# Patient Record
Sex: FEMALE | Race: WHITE | NOT HISPANIC OR LATINO | Employment: OTHER | URBAN - METROPOLITAN AREA
[De-identification: names, ages, dates, MRNs, and addresses within clinical notes are randomized per-mention and may not be internally consistent; named-entity substitution may affect disease eponyms.]

---

## 2022-12-20 DIAGNOSIS — S42.222D CLOSED 2-PART DISPLACED FRACTURE OF SURGICAL NECK OF LEFT HUMERUS WITH ROUTINE HEALING: Primary | ICD-10-CM

## 2022-12-22 ENCOUNTER — CLINICAL SUPPORT (OUTPATIENT)
Dept: REHABILITATION | Facility: HOSPITAL | Age: 77
End: 2022-12-22
Payer: MEDICARE

## 2022-12-22 DIAGNOSIS — S42.222D CLOSED 2-PART DISPLACED FRACTURE OF SURGICAL NECK OF LEFT HUMERUS WITH ROUTINE HEALING: ICD-10-CM

## 2022-12-22 PROCEDURE — 97166 OT EVAL MOD COMPLEX 45 MIN: CPT | Mod: PN

## 2022-12-22 NOTE — PROGRESS NOTES
[facility]  Occupational Therapy Initial Evaluation    Date: 12/22/2022  Name: Katy Woodruff  Clinic Number: [id number]    Therapy Diagnosis:   Encounter Diagnosis   Name Primary?    Closed 2-part displaced fracture of surgical neck of left humerus with routine healing      Physician: Robert Witt MD    Physician Orders: OT eval and treat  Medical Diagnosis: Closed 2-part displaced fracture of surgical neck of left humerus with routine healing  Surgical Procedure and Date: n/a  Evaluation Date: 12/22/2022  Insurance Authorization Period Expiration: 12/31/2022  Plan of Care Certification Period: 02/02/2023  Date of Return to MD:   Visit # / Visits authorized: 1 / 12  FOTO: 1/3    Precautions:  Standard    Time In:10: 15  Time Out: 11: 00  Total Appointment Time (timed & untimed codes): 45 minutes    SUBJECTIVE     Date of Onset: November 27, 2022    History of Current Condition/Mechanism of Injury: Katy reports: that she was walking up the steps and fell backwards down two steps onto the L shoulder. Pt called 911 and was brought to [facility]. Pt  not in need of surgery, as doctor FREIDA Lombardi reported it was healing on its own.     Falls: none    Involved Side: L side  Dominant Side: Right  Imaging: CT; x- ray    Prior Therapy: yes, 40 years ago   Occupation:  retired   Working presently: retired  Duties: taking care of the house, and self , and quilting     Functional Limitations/Social History:    Previous functional status includes: Independent with all ADLs.     Current Functional Status   Home/Living environment: lives alone, two story home       Limitation of Functional Status as follows:   ADLs/IADLs:     - Feeding:  pt is independent     - Bathing: pt is independent     - Dressing/Grooming: pt is independent     - Driving: dependent      Leisure: quilting   Pain:  Functional Pain Scale Rating 0-10: Current 0/10, worst 10/10  Location: L shoulder   Description:  Aching  Aggravating Factors: Moving  Easing Factors: pt performing exercises     Patient's Goals for Therapy: would like to get back to normal     Medical History:   No past medical history on file.    Surgical History:    has no past surgical history on file.    Medications:   currently has no medications in their medication list.    Allergies:   Review of patient's allergies indicates:  Not on File       OBJECTIVE     19 lbs of force L hand  21 lbs of force R hand     25 1/2 cm  L UE  23 cm R UE    20 f  28 ex  42 abduction   Limitation/Restriction for FOTO shoulder Survey    Therapist reviewed FOTO scores for Katy Woodruff on 12/22/2022.   FOTO documents entered into [employer] - see Media section.    Limitation Score: 58%         Treatment   Total Treatment time (time-based codes) separate from Evaluation: 0 minutes    Patient Education and Home Exercises      Education provided:   - Pt was educated on elevation, brace wearing schedule, HEP, plan of care, goals ,    Written Home Exercises Provided: yes.  Exercises were reviewed and Katy was able to demonstrate them prior to the end of the session.  Katy demonstrated good  understanding of the education provided. See EMR under Patient Instructions for exercises provided during therapy sessions.     Pt was advised to perform these exercises free of pain, and to stop performing them if pain occurs.    Patient/Family Education: role of OT, goals for OT, scheduling/cancellations - pt verbalized understanding. Discussed insurance limitations with patient.      ASSESSMENT     Katy Woodruff is a 77 y.o. female referred to outpatient occupational therapy and presents with a medical diagnosis of L humerus fx.  Patient presents with the following therapy deficits: Decreased ROM, Decreased functional hand use, Increased pain, Edema, Joint Stiffness, and Diminished/Impaired Coordination and demonstrates limitations as described in the chart below. Following medical record  review it is determined that pt will benefit from occupational therapy services in order to maximize pain free and/or functional use of left shoulder. The following goals were discussed with the patient and patient is in agreement with them as to be addressed in the treatment plan. The patient's rehab potential is Good.     Anticipated barriers to occupational therapy: none  Pt has no cultural, educational or language barriers to learning provided.    Profile and History Assessment of Occupational Performance Level of Clinical Decision Making Complexity Score   Occupational Profile:   Katy Woodruff is a 77 y.o. female who lives alone and is retired Katy Woodruff has difficulty with  ADLs and IADLs as listed previously, which  Affecting herdaily functional abilities.      Comorbidities:    has no past medical history on file.    Medical and Therapy History Review:   Med hx reviewed              Performance Deficits    Physical:  Joint Mobility  Joint Stability  Muscle Power/Strength  Muscle Endurance  Edema  Gross Motor Coordination  Pain    Cognitive:  No Deficits    Psychosocial:    No Deficits     Clinical Decision Making:  moderate    Assessment Process:  Problem-Focused Assessments    Modification/Need for Assistance:  Not Necessary    Intervention Selection:  Several Treatment Options       moderate  Based on PMHX, co morbidities , data from assessments and functional level of assistance required with task and clinical presentation directly impacting function.       The following goals were discussed with the patient and patient is in agreement with them as to be addressed in the treatment plan.     Goals:   Pt will improve L shoulder AROM to wfl prior to dc  Pt will improve L shoulder pain to 1/10 or less when performing  light self-care and house tasks by dc  Pt will improve L UE swelling by 1 cm or less prior to dc  Pt will be 100% compliant with HEP prior to dc      PLAN   Plan of Care Certification:  12/22/2022 to 2/02/2023.     Outpatient Occupational Therapy 3 times weekly for 4 weeks to include the following interventions: Manual therapy/joint mobilizations, Modalities for pain management, Therapeutic exercises/activities., Strengthening, and Electrical Modalities.      Louisa Jarrett OT      I CERTIFY THE NEED FOR THESE SERVICES FURNISHED UNDER THIS PLAN OF TREATMENT AND WHILE UNDER MY CARE  Physician's comments:      Physician's Signature: ___________________________________________________

## 2022-12-29 ENCOUNTER — CLINICAL SUPPORT (OUTPATIENT)
Dept: REHABILITATION | Facility: HOSPITAL | Age: 77
End: 2022-12-29
Payer: MEDICARE

## 2022-12-29 DIAGNOSIS — S42.222D CLOSED 2-PART DISPLACED FRACTURE OF SURGICAL NECK OF LEFT HUMERUS WITH ROUTINE HEALING: Primary | ICD-10-CM

## 2022-12-29 PROCEDURE — 97140 MANUAL THERAPY 1/> REGIONS: CPT | Mod: PN

## 2022-12-29 PROCEDURE — 97110 THERAPEUTIC EXERCISES: CPT | Mod: PN

## 2022-12-29 NOTE — PROGRESS NOTES
Occupational Therapy Daily Treatment Note   Name: Katy Woodruff 1945  MRN: [id number]    Visit Date: 12/29/2022  Visit #: 2 / 12  Authorization period Expiration: 12/31/2022    Plan of Care Expiration: 02/02/2023  Precautions: No heavy lifting overhead using L UE    Time In: 11: 00  Time Out: 11: 45  Total 1:1 Treatment Time: 45 min    Treatment Diagnosis:   Encounter Diagnosis   Name Primary?    Closed 2-part displaced fracture of surgical neck of left humerus with routine healing Yes     Physician: Robert Witt MD    Subjective   Pt reports: that she is doing her exercises   She was compliant with home exercise program.     Pain Scale:  0/10 on VAS currently  Pain Location: left shoulder    Objective   Katy received therapeutic exercises to develop strength, endurance, ROM, and flexibility for 15 minutes including:  PROM exercsies including : cane exercises in supine and standing for L shoulder ER, overhead door pulley for L shoulder flexion/abduction   L shoulder UE ranger at floor level for 2 x 10 reps    Katy received the following manual therapy techniques: gentle PROM of the L shoulder for 10 mins in order to improve ROM and prevent joint stiffness    Katy received the following direct contact modalities after being cleared for contraindications: Heat pack to the L shoulder prior to mobilization       Home Exercises and Education Provided     Education provided re:   - progress towards goals   - role of therapy in multi - disciplinary team, goals for therapy  Pt educated on condition, POC, and expectations in therapy.  No spiritual or educational barriers to learning provided    Home exercises:  Pt will be provided HEP during course of treatment with progressions as appropriate. Pt was advised to perform these exercises free of pain, and to stop performing them if pain occurs.   Katy demonstrated good  understanding of the education provided.     Assessment   Katy  is progressing well towards her goals and no updates to goals at this time.     Pt prognosis is Good. Pt will continue to benefit from skilled outpatient physical therapy to address the deficits listed in the problem list chart on initial evaluation, provide pt/family education and to maximize pt's level of independence in the home and community environment.     Medical necessity is demonstrated by the impairments and functional limitations listed on the Initial Evaluation.     Anticipated barriers to physical therapy: none  Pt's spiritual, cultural and educational needs considered and pt agreeable to plan of care and goals.    Goals   Goals:   Pt will improve L shoulder AROM to wfl prior to dc  Pt will improve L shoulder pain to 1/10 or less when performing  light self-care and house tasks by dc  Pt will improve L UE swelling by 1 cm or less prior to dc  Pt will be 100% compliant with HEP prior to dc      Plan   Continue with established Plan of Care towards Occupational Therapy goals.   Discussed Plan of Care with patient: Yes  Louisa Jarrett OT  12/29/2022

## 2022-12-30 ENCOUNTER — CLINICAL SUPPORT (OUTPATIENT)
Dept: REHABILITATION | Facility: HOSPITAL | Age: 77
End: 2022-12-30
Payer: MEDICARE

## 2022-12-30 DIAGNOSIS — S42.222D CLOSED 2-PART DISPLACED FRACTURE OF SURGICAL NECK OF LEFT HUMERUS WITH ROUTINE HEALING: Primary | ICD-10-CM

## 2022-12-30 PROCEDURE — 97110 THERAPEUTIC EXERCISES: CPT | Mod: PN

## 2022-12-30 PROCEDURE — 97032 APPL MODALITY 1+ESTIM EA 15: CPT | Mod: PN

## 2022-12-30 NOTE — PROGRESS NOTES
Occupational Therapy Daily Treatment Note   Name: Katy Woodruff 1945  MRN: [id number]    Visit Date: 12/30/2022  Visit #: 3 / 12  Authorization period Expiration: 12/31/2022    Plan of Care Expiration: 02/02/2023  Precautions: No heavy lifting overhead using L UE    Time In: 11: 00  Time Out: 11: 45  Total 1:1 Treatment Time: 45 min    Treatment Diagnosis:   Encounter Diagnosis   Name Primary?    Closed 2-part displaced fracture of surgical neck of left humerus with routine healing Yes     Physician: Robert Witt MD    Subjective   Pt reports: pt ordered a door pulley  She was compliant with home exercise program.     Pain Scale:  0/10 on VAS currently  Pain Location: left shoulder    Objective   Katy received therapeutic exercises to develop strength, endurance, ROM, and flexibility for 30 minutes including:  PROM exercsies including : cane exercises in supine for L shoulder ER 15 reps, overhead door pulley for L shoulder flexion/abduction   B shoulder shrugs for 15 reps  B scapular pinches 15 reps  AROM L elbow extension for 30 reps.    Katy received the following direct contact modalities after being cleared for contraindications: Heat pack to the L shoulder prior to mobilization     Katy received the following supervised modalities:  IFC to the L shoulder for 10 mins @ 12.5 volts in order to alleviate pain levels.      Home Exercises and Education Provided     Education provided re:   - progress towards goals   - role of therapy in multi - disciplinary team, goals for therapy  Pt educated on condition, POC, and expectations in therapy.  No spiritual or educational barriers to learning provided    Home exercises:  Pt will be provided HEP during course of treatment with progressions as appropriate. Pt was advised to perform these exercises free of pain, and to stop performing them if pain occurs.   Katy demonstrated good  understanding of the education provided.      Assessment   Katy is progressing well towards her goals and no updates to goals at this time.     Pt prognosis is Good. Pt will continue to benefit from skilled outpatient physical therapy to address the deficits listed in the problem list chart on initial evaluation, provide pt/family education and to maximize pt's level of independence in the home and community environment.     Medical necessity is demonstrated by the impairments and functional limitations listed on the Initial Evaluation.     Anticipated barriers to physical therapy: none  Pt's spiritual, cultural and educational needs considered and pt agreeable to plan of care and goals.    Goals   Goals:   Pt will improve L shoulder AROM to wfl prior to dc  Pt will improve L shoulder pain to 1/10 or less when performing  light self-care and house tasks by dc  Pt will improve L UE swelling by 1 cm or less prior to dc  Pt will be 100% compliant with HEP prior to dc      Plan   Continue with established Plan of Care towards Occupational Therapy goals.   Discussed Plan of Care with patient: Yes  Louisa Jarrett OT  12/30/2022

## 2023-01-03 ENCOUNTER — CLINICAL SUPPORT (OUTPATIENT)
Dept: REHABILITATION | Facility: HOSPITAL | Age: 78
End: 2023-01-03
Payer: MEDICARE

## 2023-01-03 DIAGNOSIS — S42.222D CLOSED 2-PART DISPLACED FRACTURE OF SURGICAL NECK OF LEFT HUMERUS WITH ROUTINE HEALING: Primary | ICD-10-CM

## 2023-01-03 PROCEDURE — 97110 THERAPEUTIC EXERCISES: CPT | Mod: PN

## 2023-01-03 PROCEDURE — 97140 MANUAL THERAPY 1/> REGIONS: CPT | Mod: PN

## 2023-01-03 NOTE — PROGRESS NOTES
Occupational Therapy Daily Treatment Note   Name: Katy Woodruff 1945  MRN: [id number]    Visit Date: 1/3/2023  Visit #: 4 / 12  Authorization period Expiration: 12/31/2022    Plan of Care Expiration: 02/02/2023  Precautions: No heavy lifting overhead using L UE    Time In: 1: 15  Time Out: 2: 00  Total 1:1 Treatment Time: 45 min    Treatment Diagnosis:   Encounter Diagnosis   Name Primary?    Closed 2-part displaced fracture of surgical neck of left humerus with routine healing Yes     Physician: Robert Witt MD    Subjective   Pt reports: she has not seen any differences- good or bad   She was compliant with home exercise program.     Pain Scale:  0/10 on VAS currently  Pain Location: left shoulder    Objective   Katy received therapeutic exercises to develop strength, endurance, ROM, and flexibility for 30 minutes including:  PROM exercsies including :   cane exercises in standing for L shoulder ER 15 reps  overhead door pulley for L shoulder flexion  Self-prom in supine for L shoulder flexion     AROM L elbow extension for 15 reps.  L shoulder circumduction at hip level using towel against wall for 10 reps    Katy received the following direct contact modalities after being cleared for contraindications: Heat pack to the L shoulder prior to mobilization     Katy received the following manual therapy techniques:  Pt received gentle PROM to the L shoulder for flexion, abduction, ER/IR, extension to prevent joint stiffness       Home Exercises and Education Provided     Education provided re:   - progress towards goals   - role of therapy in multi - disciplinary team, goals for therapy  Pt educated on condition, POC, and expectations in therapy.  No spiritual or educational barriers to learning provided    Home exercises:  Pt will be provided HEP during course of treatment with progressions as appropriate. Pt was advised to perform these exercises free of pain, and to stop  performing them if pain occurs.   Katy demonstrated good  understanding of the education provided.     Assessment   Katy is progressing well towards her goals and no updates to goals at this time.     Pt prognosis is Good. Pt will continue to benefit from skilled outpatient physical therapy to address the deficits listed in the problem list chart on initial evaluation, provide pt/family education and to maximize pt's level of independence in the home and community environment.     Medical necessity is demonstrated by the impairments and functional limitations listed on the Initial Evaluation.     Anticipated barriers to physical therapy: none  Pt's spiritual, cultural and educational needs considered and pt agreeable to plan of care and goals.    Goals   Goals:   Pt will improve L shoulder AROM to wfl prior to dc  Pt will improve L shoulder pain to 1/10 or less when performing  light self-care and house tasks by dc  Pt will improve L UE swelling by 1 cm or less prior to dc  Pt will be 100% compliant with HEP prior to dc    Plan   Continue with established Plan of Care towards Occupational Therapy goals.   Discussed Plan of Care with patient: Yes  Louisa Jarrett OT  1/3/2023

## 2023-01-05 ENCOUNTER — CLINICAL SUPPORT (OUTPATIENT)
Dept: REHABILITATION | Facility: HOSPITAL | Age: 78
End: 2023-01-05
Payer: MEDICARE

## 2023-01-05 DIAGNOSIS — S42.222D CLOSED 2-PART DISPLACED FRACTURE OF SURGICAL NECK OF LEFT HUMERUS WITH ROUTINE HEALING: Primary | ICD-10-CM

## 2023-01-05 PROCEDURE — 97032 APPL MODALITY 1+ESTIM EA 15: CPT | Mod: PN

## 2023-01-05 PROCEDURE — 97110 THERAPEUTIC EXERCISES: CPT | Mod: PN

## 2023-01-05 NOTE — PROGRESS NOTES
Occupational Therapy Daily Treatment Note   Name: Katy Woodruff 1945  MRN: [id number]    Visit Date: 1/5/2023  Visit #: 5 / 12  Authorization period Expiration: 12/31/2022    Plan of Care Expiration: 02/02/2023  Precautions: No heavy lifting overhead using L UE    Time In: 10: 15  Time Out: 11: 00   Total 1:1 Treatment Time: 45 min    Treatment Diagnosis:   Encounter Diagnosis   Name Primary?    Closed 2-part displaced fracture of surgical neck of left humerus with routine healing Yes     Physician: Robert Witt MD    Subjective   Pt reports: she feels like she is able to do more with the L UE that she could not do before   She was compliant with home exercise program.     Pain Scale:  0/10 on VAS currently  Pain Location: left shoulder    Objective   Katy received therapeutic exercises to develop strength, endurance, ROM, and flexibility for 30 minutes including:  cane exercises in standing for L shoulder ER / abduction 10 reps each  overhead door pulley for L shoulder flexion  L shoulder wall slides : flexion , abduction  L shoulder circumduction at hip level UE ranger or 10 reps  L shoulder anterior/ posterior capsule stretches 10 reps  L shoulder IR stretch using towel for 10 reps    Katy received the following direct contact modalities after being cleared for contraindications: Heat pack to the L shoulder prior to mobilization     Katy received the following manual therapy techniques:  Pt received E stim to the L shoulder @ 16.5 volts for 10 mins in order to bring blood flow to the affected area and improve pain levels,       Home Exercises and Education Provided     Education provided re:   - progress towards goals   - role of therapy in multi - disciplinary team, goals for therapy  Pt educated on condition, POC, and expectations in therapy.  No spiritual or educational barriers to learning provided    Home exercises:  Pt will be provided HEP during course of  treatment with progressions as appropriate. Pt was advised to perform these exercises free of pain, and to stop performing them if pain occurs.   Katy demonstrated good  understanding of the education provided.     Assessment   Katy is progressing well towards her goals and no updates to goals at this time.     Pt prognosis is Good. Pt will continue to benefit from skilled outpatient physical therapy to address the deficits listed in the problem list chart on initial evaluation, provide pt/family education and to maximize pt's level of independence in the home and community environment.     Medical necessity is demonstrated by the impairments and functional limitations listed on the Initial Evaluation.     Anticipated barriers to physical therapy: none  Pt's spiritual, cultural and educational needs considered and pt agreeable to plan of care and goals.    Goals   Goals:   Pt will improve L shoulder AROM to wfl prior to dc  Pt will improve L shoulder pain to 1/10 or less when performing  light self-care and house tasks by dc  Pt will improve L UE swelling by 1 cm or less prior to dc  Pt will be 100% compliant with HEP prior to dc    Plan   Continue with established Plan of Care towards Occupational Therapy goals.   Discussed Plan of Care with patient: Yes  Louisa Jarrett OT  1/5/2023

## 2023-01-06 ENCOUNTER — CLINICAL SUPPORT (OUTPATIENT)
Dept: REHABILITATION | Facility: HOSPITAL | Age: 78
End: 2023-01-06
Payer: MEDICARE

## 2023-01-06 DIAGNOSIS — S42.222D CLOSED 2-PART DISPLACED FRACTURE OF SURGICAL NECK OF LEFT HUMERUS WITH ROUTINE HEALING: Primary | ICD-10-CM

## 2023-01-06 PROCEDURE — 97110 THERAPEUTIC EXERCISES: CPT | Mod: PN

## 2023-01-06 PROCEDURE — 97140 MANUAL THERAPY 1/> REGIONS: CPT | Mod: PN

## 2023-01-06 NOTE — PROGRESS NOTES
Occupational Therapy Daily Treatment Note   Name: Katy Woodruff 1945  MRN: [id number]    Visit Date: 1/6/2023  Visit #: 6 / 12  Authorization period Expiration: 12/31/2022    Plan of Care Expiration: 02/02/2023  Precautions: No heavy lifting overhead using L UE    Time In: 11: 00  Time Out: 11: 45  Total 1:1 Treatment Time: 45 min    Treatment Diagnosis:   Encounter Diagnosis   Name Primary?    Closed 2-part displaced fracture of surgical neck of left humerus with routine healing Yes     Physician: Robert Witt MD    Subjective   Pt reports: she feels like she is able to do more with the L UE that she could not do before   She was compliant with home exercise program.     Pain Scale:  0/10 on VAS currently at rest   Pain Location: left shoulder    Objective   Katy received therapeutic exercises to develop strength, endurance, ROM, and flexibility for 30 minutes including:   L shoulder IR using 2 # pulley 3 x 10 reps each  L shoulder extension 2 # pulley 3 x 10 reps each   overhead door pulley for L shoulder flexion/abduction   L shoulder circumduction at hip level UE ranger or 10 reps  Chest presses using 2 # dowel loren for 3 x 10 reps    Katy received the following direct contact modalities after being cleared for contraindications: Heat pack to the L shoulder prior to mobilization and ice pack post session     Katy received the following manual therapy techniques:  Pt received soft tissue mobilization to the L shoulder joint and surrounding musculature in conjuction with PROM to the L shoulder in order to alleviate pain and prevent joint stiffness       Home Exercises and Education Provided     Education provided re:   - progress towards goals   - role of therapy in multi - disciplinary team, goals for therapy  Pt educated on condition, POC, and expectations in therapy.  No spiritual or educational barriers to learning provided    Home exercises:  Pt will be provided HEP  during course of treatment with progressions as appropriate. Pt was advised to perform these exercises free of pain, and to stop performing them if pain occurs.   Katy demonstrated good  understanding of the education provided.     Assessment   Katy is progressing well towards her goals and no updates to goals at this time.     Pt prognosis is Good. Pt will continue to benefit from skilled outpatient physical therapy to address the deficits listed in the problem list chart on initial evaluation, provide pt/family education and to maximize pt's level of independence in the home and community environment.     Medical necessity is demonstrated by the impairments and functional limitations listed on the Initial Evaluation.     Anticipated barriers to physical therapy: none  Pt's spiritual, cultural and educational needs considered and pt agreeable to plan of care and goals.    Goals   Goals:   Pt will improve L shoulder AROM to wfl prior to dc  Pt will improve L shoulder pain to 1/10 or less when performing  light self-care and house tasks by dc  Pt will improve L UE swelling by 1 cm or less prior to dc  Pt will be 100% compliant with HEP prior to dc    Plan   Continue with established Plan of Care towards Occupational Therapy goals.   Discussed Plan of Care with patient: Yes  Louisa Jarrett OT  1/6/2023

## 2023-01-09 ENCOUNTER — CLINICAL SUPPORT (OUTPATIENT)
Dept: REHABILITATION | Facility: HOSPITAL | Age: 78
End: 2023-01-09
Payer: MEDICARE

## 2023-01-09 DIAGNOSIS — S42.222D CLOSED 2-PART DISPLACED FRACTURE OF SURGICAL NECK OF LEFT HUMERUS WITH ROUTINE HEALING: Primary | ICD-10-CM

## 2023-01-09 PROCEDURE — 97110 THERAPEUTIC EXERCISES: CPT | Mod: PN

## 2023-01-09 PROCEDURE — 97032 APPL MODALITY 1+ESTIM EA 15: CPT | Mod: PN

## 2023-01-09 NOTE — PROGRESS NOTES
Occupational Therapy Daily Treatment Note   Name: Katy Woodruff 1945  MRN: [id number]    Visit Date: 1/9/2023  Visit #: 7 / 12  Authorization period Expiration: 12/31/2022    Plan of Care Expiration: 02/02/2023  Precautions: No heavy lifting overhead using L UE    Time In: 11: 00  Time Out: 11: 45  Total 1:1 Treatment Time: 45 min    Treatment Diagnosis:   Encounter Diagnosis   Name Primary?    Closed 2-part displaced fracture of surgical neck of left humerus with routine healing Yes     Physician: Robert Witt MD    Subjective   Pt reports: she said she is still seeing some improvement in function but still cant reach into high cabinets  She was compliant with home exercise program.     Pain Scale:  0/10 on VAS currently at rest   Pain Location: left shoulder    Objective   Katy received therapeutic exercises to develop strength, endurance, ROM, and flexibility for 25 minutes including:  UBE for 6 mins  overhead door pulley for L shoulder flexion/abduction   B shoulder flexion using 3 # dowel loren in supine for 3 x 10 reps  Chest presses using 3 # dowel loren in supine for 3 x 10 reps    Katy received manual therapy: PROM to the L shoulder in order to prevent joint stiffness. Pt only tolerating this tx for ~5 mins.     Katy received the following direct contact modalities after being cleared for contraindications: Heat pack to the L shoulder prior to mobilization and ice pack post session     Katy received the following manual therapy techniques:  Pt received IFC on L shoulder @ 15.0 volts for 10 minutes in an effort to improve pain levels and bring blood flow to the affected area.       Home Exercises and Education Provided     Education provided re:   - progress towards goals   - role of therapy in multi - disciplinary team, goals for therapy  Pt educated on condition, POC, and expectations in therapy.  No spiritual or educational barriers to learning provided    Home  exercises:  Pt will be provided HEP during course of treatment with progressions as appropriate. Pt was advised to perform these exercises free of pain, and to stop performing them if pain occurs.   Katy demonstrated good  understanding of the education provided.     Assessment   Katy is progressing well towards her goals and no updates to goals at this time.     Pt prognosis is Good. Pt will continue to benefit from skilled outpatient physical therapy to address the deficits listed in the problem list chart on initial evaluation, provide pt/family education and to maximize pt's level of independence in the home and community environment.     Medical necessity is demonstrated by the impairments and functional limitations listed on the Initial Evaluation.     Anticipated barriers to physical therapy: none  Pt's spiritual, cultural and educational needs considered and pt agreeable to plan of care and goals.    Goals   Goals:   Pt will improve L shoulder AROM to wfl prior to dc  Pt will improve L shoulder pain to 1/10 or less when performing  light self-care and house tasks by dc  Pt will improve L UE swelling by 1 cm or less prior to dc  Pt will be 100% compliant with HEP prior to dc    Plan   Continue with established Plan of Care towards Occupational Therapy goals.   Discussed Plan of Care with patient: Yes  Louisa Jarrett OT  1/9/2023

## 2023-01-12 ENCOUNTER — CLINICAL SUPPORT (OUTPATIENT)
Dept: REHABILITATION | Facility: HOSPITAL | Age: 78
End: 2023-01-12
Payer: MEDICARE

## 2023-01-12 DIAGNOSIS — S42.222D CLOSED 2-PART DISPLACED FRACTURE OF SURGICAL NECK OF LEFT HUMERUS WITH ROUTINE HEALING: Primary | ICD-10-CM

## 2023-01-12 PROCEDURE — 97110 THERAPEUTIC EXERCISES: CPT | Mod: PN

## 2023-01-12 NOTE — PROGRESS NOTES
Occupational Therapy Daily Treatment Note   Name: Katy Woodruff 1945  MRN: [id number]    Visit Date: 1/12/2023  Visit #: 8 / 12  Authorization period Expiration: 12/31/2022    Plan of Care Expiration: 02/02/2023  Precautions: No heavy lifting overhead using L UE    Time In: 11: 00  Time Out: 11: 45  Total 1:1 Treatment Time: 45 min    Treatment Diagnosis:   No diagnosis found.    Physician: Robert Witt MD    Subjective   Pt reports: she said she is still seeing some improvement in function but still cant reach into high cabinets  She was compliant with home exercise program.     Pain Scale:  0/10 on VAS currently at rest   Pain Location: left shoulder    Objective   Katy received therapeutic exercises to develop strength, endurance, ROM, and flexibility for 35 minutes including:  UBE for 6 mins  overhead door pulley for L shoulder flexion/abduction   L shoulder extension using green theraband 3 x 10   L shoulder ER green theraband 3 x 10   L shoulder diagonal abduction 3 x 10   L shoulder flexion green theraband 3 x 10  Wall pushups x 15  B shoulder wall crawls flexion & adduction x 15    Katy received the following direct contact modalities after being cleared for contraindications: Heat pack to the L shoulder prior to mobilization and ice pack post session     Home Exercises and Education Provided     Education provided re:   - progress towards goals   - role of therapy in multi - disciplinary team, goals for therapy  Pt educated on condition, POC, and expectations in therapy.  No spiritual or educational barriers to learning provided    Home exercises:  Pt will be provided HEP during course of treatment with progressions as appropriate. Pt was advised to perform these exercises free of pain, and to stop performing them if pain occurs.   Katy demonstrated good  understanding of the education provided.     Assessment   Katy is progressing well towards her goals and no  updates to goals at this time.     Pt prognosis is Good. Pt will continue to benefit from skilled outpatient physical therapy to address the deficits listed in the problem list chart on initial evaluation, provide pt/family education and to maximize pt's level of independence in the home and community environment.     Medical necessity is demonstrated by the impairments and functional limitations listed on the Initial Evaluation.     Anticipated barriers to occupational therapy: none  Pt's spiritual, cultural and educational needs considered and pt agreeable to plan of care and goals.    Goals   Goals:   Pt will improve L shoulder AROM to wfl prior to dc.  Pt will improve L shoulder pain to 1/10 or less when performing  light self-care and house tasks by dc.  Pt will improve L UE swelling by 1 cm or less prior to dc.  Pt will be 100% compliant with HEP prior to dc.    Plan   Continue with established Plan of Care towards Occupational Therapy goals.   Discussed Plan of Care with patient: Yes  Louisa Jarrett OT  1/12/2023

## 2023-01-17 ENCOUNTER — CLINICAL SUPPORT (OUTPATIENT)
Dept: REHABILITATION | Facility: HOSPITAL | Age: 78
End: 2023-01-17
Payer: MEDICARE

## 2023-01-17 DIAGNOSIS — S42.222D CLOSED 2-PART DISPLACED FRACTURE OF SURGICAL NECK OF LEFT HUMERUS WITH ROUTINE HEALING: Primary | ICD-10-CM

## 2023-01-17 PROCEDURE — 97110 THERAPEUTIC EXERCISES: CPT | Mod: PN

## 2023-01-17 PROCEDURE — 97032 APPL MODALITY 1+ESTIM EA 15: CPT | Mod: PN

## 2023-01-17 NOTE — PROGRESS NOTES
Occupational Therapy Daily Treatment Note   Name: Katy Woodruff 1945  MRN: [id number]    Visit Date: 1/17/2023  Visit #: 9 / 12  Authorization period Expiration: 12/31/2022    Plan of Care Expiration: 02/02/2023  Precautions: No heavy lifting overhead using L UE    Time In: 11: 00  Time Out: 11: 45  Total 1:1 Treatment Time:  45 min    Treatment Diagnosis:   Encounter Diagnosis   Name Primary?    Closed 2-part displaced fracture of surgical neck of left humerus with routine healing Yes       Physician: Robert Witt MD    Subjective   Pt reports: shoulder is about the same from last session   She was compliant with home exercise program.     Pain Scale:  0/10 on VAS currently at rest   Pain Location: left shoulder    Objective   Katy received therapeutic exercises to develop strength, endurance, ROM, and flexibility for 35 minutes including:  UBE for 6 mins  overhead door pulley for L shoulder flexion/abduction   B shoulder chest/shoulder presses using 3 # dowel loren for 3 x 10 reps  B shoulder IR using 3 # dowel loren for 3 x 10 reps    Katy received the following direct contact modalities after being cleared for contraindications: Heat pack to the L shoulder prior to mobilization and ice pack post session     Katy received the following supervised modality:  Pt received IFC to the L shoulder @ 13.5 volts to alleviate pain levels.    Home Exercises and Education Provided     Education provided re:   - progress towards goals   - role of therapy in multi - disciplinary team, goals for therapy  Pt educated on condition, POC, and expectations in therapy.  No spiritual or educational barriers to learning provided    Home exercises:  Pt will be provided HEP during course of treatment with progressions as appropriate. Pt was advised to perform these exercises free of pain, and to stop performing them if pain occurs.   Katy demonstrated good  understanding of the education provided.      Assessment   Katy is progressing well towards her goals and no updates to goals at this time.     Pt prognosis is Good. Pt will continue to benefit from skilled outpatient physical therapy to address the deficits listed in the problem list chart on initial evaluation, provide pt/family education and to maximize pt's level of independence in the home and community environment.     Medical necessity is demonstrated by the impairments and functional limitations listed on the Initial Evaluation.     Anticipated barriers to occupational therapy: none  Pt's spiritual, cultural and educational needs considered and pt agreeable to plan of care and goals.    Goals   Goals:   Pt will improve L shoulder AROM to wfl prior to dc.  Pt will improve L shoulder pain to 1/10 or less when performing  light self-care and house tasks by dc.  Pt will improve L UE swelling by 1 cm or less prior to dc.  Pt will be 100% compliant with HEP prior to dc.    Plan   Continue with established Plan of Care towards Occupational Therapy goals.   Discussed Plan of Care with patient: Yes  Louisa Jarrett, OT  1/17/2023

## 2023-01-20 ENCOUNTER — CLINICAL SUPPORT (OUTPATIENT)
Dept: REHABILITATION | Facility: HOSPITAL | Age: 78
End: 2023-01-20
Payer: MEDICARE

## 2023-01-20 DIAGNOSIS — S42.222D CLOSED 2-PART DISPLACED FRACTURE OF SURGICAL NECK OF LEFT HUMERUS WITH ROUTINE HEALING: Primary | ICD-10-CM

## 2023-01-20 PROCEDURE — 97032 APPL MODALITY 1+ESTIM EA 15: CPT | Mod: PN

## 2023-01-20 PROCEDURE — 97110 THERAPEUTIC EXERCISES: CPT | Mod: PN

## 2023-01-20 NOTE — PROGRESS NOTES
Occupational Therapy Daily Treatment Note   Name: Katy Woodruff 1945  MRN: [id number]    Visit Date: 1/20/2023  Visit #: 10 / 12  Authorization period Expiration: 12/31/2022    Plan of Care Expiration: 02/02/2023  Precautions: No heavy lifting overhead using L UE    Time In: 11: 00  Time Out: 11: 45  Total 1:1 Treatment Time:  45 min    Treatment Diagnosis:   No diagnosis found.      Physician: Robert Witt MD    Subjective   Pt reports: shoulder is about the same from last session   She was compliant with home exercise program.     Pain Scale:  0/10 on VAS currently at rest   Pain Location: left shoulder    Objective   Katy received therapeutic exercises to develop strength, endurance, ROM, and flexibility for 35 minutes including:  UBE for 6 mins  overhead door pulley for L shoulder flexion/abduction   B scapular retraction in prone 3 x 10 reps  B scapular pinches using red theraband for 3 x 10 reps  B shoulder ER, arms abducted to 90 degrees using 1 # wt for 3 x 10 reps  L shoulder tricep pull downs using 3 # pulley wt for 3 x 10 reps    Katy received the following direct contact modalities after being cleared for contraindications: Heat pack to the L shoulder prior to mobilization and ice pack post session     Katy received the following supervised modality:  Pt received IFC to the L shoulder @ 13.5 volts to alleviate pain levels.    Home Exercises and Education Provided     Education provided re:   - progress towards goals   - role of therapy in multi - disciplinary team, goals for therapy  Pt educated on condition, POC, and expectations in therapy.  No spiritual or educational barriers to learning provided    Home exercises:  Pt will be provided HEP during course of treatment with progressions as appropriate. Pt was advised to perform these exercises free of pain, and to stop performing them if pain occurs.   Katy demonstrated good  understanding of the education  provided.     Assessment   Katy is progressing well towards her goals and no updates to goals at this time.     Pt prognosis is Good. Pt will continue to benefit from skilled outpatient physical therapy to address the deficits listed in the problem list chart on initial evaluation, provide pt/family education and to maximize pt's level of independence in the home and community environment.     Medical necessity is demonstrated by the impairments and functional limitations listed on the Initial Evaluation.     Anticipated barriers to occupational therapy: none  Pt's spiritual, cultural and educational needs considered and pt agreeable to plan of care and goals.    Goals   Goals:   Pt will improve L shoulder AROM to wfl prior to dc.  Pt will improve L shoulder pain to 1/10 or less when performing  light self-care and house tasks by dc.  Pt will improve L UE swelling by 1 cm or less prior to dc.  Pt will be 100% compliant with HEP prior to dc.    Plan   Continue with established Plan of Care towards Occupational Therapy goals.   Discussed Plan of Care with patient: Yes  Louisa Jarrett, OT  1/20/2023

## 2023-01-23 ENCOUNTER — CLINICAL SUPPORT (OUTPATIENT)
Dept: REHABILITATION | Facility: HOSPITAL | Age: 78
End: 2023-01-23
Payer: MEDICARE

## 2023-01-23 DIAGNOSIS — S42.222D CLOSED 2-PART DISPLACED FRACTURE OF SURGICAL NECK OF LEFT HUMERUS WITH ROUTINE HEALING: Primary | ICD-10-CM

## 2023-01-23 PROCEDURE — 97110 THERAPEUTIC EXERCISES: CPT | Mod: PN

## 2023-01-23 PROCEDURE — 97032 APPL MODALITY 1+ESTIM EA 15: CPT | Mod: PN

## 2023-01-23 NOTE — PROGRESS NOTES
Occupational Therapy Daily Treatment Note   Name: Katy Woodruff 1945  MRN: [id number]    Visit Date: 1/23/2023  Visit #: 11 / 12  Authorization period Expiration: 12/31/2022    Plan of Care Expiration: 02/02/2023  Precautions: No heavy lifting overhead using L UE    Time In: 11: 00  Time Out: 11: 45  Total 1:1 Treatment Time:  45 min    Treatment Diagnosis:   Encounter Diagnosis   Name Primary?    Closed 2-part displaced fracture of surgical neck of left humerus with routine healing Yes         Physician: Robert Witt MD    Subjective   Pt reports: shoulder is about the same from last session   She was compliant with home exercise program     Pain Scale:  0/10 on VAS currently at rest   Pain Location: left shoulder    Objective   Katy received therapeutic exercises to develop strength, endurance, ROM, and flexibility for 25 minutes including:  UBE for 6 mins  overhead door pulley for L shoulder flexion/abduction   Pt performed L shoulder reverse pendulums for 30 reps  Pt performed L shoulder scapular punches using 2 # wt for 30 reps  Pt performed L shoulder IR using overhead pulley (pain noted, pt told to be careful with this ex and if pain exhibited at home- stop the ex)  Pt performed L shoulder IR using towel (pt responding better to this stretch)    Katy received the following direct contact modalities after being cleared for contraindications: 10 mins Heat pack to the L shoulder prior to mobilization and ice pack post session     Katy received the following supervised modality: 10 mins  Pt received IFC to the L shoulder @ 13.5 volts to alleviate pain levels.    Home Exercises and Education Provided     Education provided re:   - progress towards goals   - role of therapy in multi - disciplinary team, goals for therapy  Pt educated on condition, POC, and expectations in therapy.  No spiritual or educational barriers to learning provided    Home exercises:  Pt will be  provided HEP during course of treatment with progressions as appropriate. Pt was advised to perform these exercises free of pain, and to stop performing them if pain occurs.   Katy demonstrated good  understanding of the education provided.     Assessment   Katy is progressing well towards her goals and no updates to goals at this time.     Pt prognosis is Good. Pt will continue to benefit from skilled outpatient physical therapy to address the deficits listed in the problem list chart on initial evaluation, provide pt/family education and to maximize pt's level of independence in the home and community environment.     Medical necessity is demonstrated by the impairments and functional limitations listed on the Initial Evaluation.     Anticipated barriers to occupational therapy: none  Pt's spiritual, cultural and educational needs considered and pt agreeable to plan of care and goals.    Goals   Goals:   Pt will improve L shoulder AROM to wfl prior to dc.  Pt will improve L shoulder pain to 1/10 or less when performing  light self-care and house tasks by dc.  Pt will improve L UE swelling by 1 cm or less prior to dc.  Pt will be 100% compliant with HEP prior to dc.    Plan   Continue with established Plan of Care towards Occupational Therapy goals.   Discussed Plan of Care with patient: Yes  Louisa Jarrett OT  1/23/2023

## 2023-01-26 ENCOUNTER — CLINICAL SUPPORT (OUTPATIENT)
Dept: REHABILITATION | Facility: HOSPITAL | Age: 78
End: 2023-01-26
Payer: MEDICARE

## 2023-01-26 DIAGNOSIS — S42.222D CLOSED 2-PART DISPLACED FRACTURE OF SURGICAL NECK OF LEFT HUMERUS WITH ROUTINE HEALING: Primary | ICD-10-CM

## 2023-01-26 PROCEDURE — 97110 THERAPEUTIC EXERCISES: CPT | Mod: PN

## 2023-01-26 PROCEDURE — 97032 APPL MODALITY 1+ESTIM EA 15: CPT | Mod: PN

## 2023-01-26 NOTE — PROGRESS NOTES
Occupational Therapy Discharge Summary    Name: Katy Woodruff 1945  MRN: [id number]   Date: 1/26/2023  Principal Problem:     Subjective:  Patient Discharged from acute occupational Therapy on 1/26/2023.  Please refer to prior OT noted date on 1/23/2023 for functional status.    Objective: Pt tolerated all treatment interventions well, including: therapeutic exercises, manual therapy techniques, and modalities for pain. No adverse effects reported      Measurements taken (1/26/2023) to be compared to initial eval:  L shoulder AROM:   110 flexion   115 abduction  52 extension  80 ER    25 lbs of force B hands   GOALS:    Pt will improve L shoulder AROM to wfl prior to dc. met  Pt will improve L shoulder pain to 1/10 or less when performing  light self-care and house tasks by dc. met  Pt will improve L UE swelling by 1 cm or less prior to dc. met  Pt will be 100% compliant with HEP prior to dc. met    Assessment:  Patient has met all goals and is not appropriate for therapy.    Reasons for Discontinuation of Therapy Services  Satisfactory goal achievement.      Plan:  Patient Discharged to: home with no OT services needed.    Louisa Jarrett, OT

## 2023-09-18 ENCOUNTER — HOSPITAL ENCOUNTER (EMERGENCY)
Facility: HOSPITAL | Age: 78
Discharge: HOME | End: 2023-09-18
Attending: STUDENT IN AN ORGANIZED HEALTH CARE EDUCATION/TRAINING PROGRAM
Payer: MEDICARE

## 2023-09-18 VITALS
BODY MASS INDEX: 23.3 KG/M2 | OXYGEN SATURATION: 96 % | RESPIRATION RATE: 18 BRPM | TEMPERATURE: 97 F | WEIGHT: 145 LBS | DIASTOLIC BLOOD PRESSURE: 71 MMHG | HEART RATE: 63 BPM | SYSTOLIC BLOOD PRESSURE: 121 MMHG | HEIGHT: 66 IN

## 2023-09-18 DIAGNOSIS — S82.891A CLOSED FRACTURE OF RIGHT ANKLE, INITIAL ENCOUNTER: Primary | ICD-10-CM

## 2023-09-18 DIAGNOSIS — T14.90XA TRAUMA: ICD-10-CM

## 2023-09-18 DIAGNOSIS — R53.1 WEAKNESS: ICD-10-CM

## 2023-09-18 DIAGNOSIS — S82.842A BIMALLEOLAR FRACTURE OF LEFT ANKLE, CLOSED, INITIAL ENCOUNTER: ICD-10-CM

## 2023-09-18 PROCEDURE — 96374 THER/PROPH/DIAG INJ IV PUSH: CPT | Mod: 59

## 2023-09-18 PROCEDURE — 63600175 PHARM REV CODE 636 W HCPCS: Performed by: STUDENT IN AN ORGANIZED HEALTH CARE EDUCATION/TRAINING PROGRAM

## 2023-09-18 PROCEDURE — 25000003 PHARM REV CODE 250: Performed by: STUDENT IN AN ORGANIZED HEALTH CARE EDUCATION/TRAINING PROGRAM

## 2023-09-18 PROCEDURE — 73610 X-RAY EXAM OF ANKLE: CPT | Mod: TC,RT

## 2023-09-18 PROCEDURE — 96361 HYDRATE IV INFUSION ADD-ON: CPT

## 2023-09-18 PROCEDURE — 96375 TX/PRO/DX INJ NEW DRUG ADDON: CPT | Mod: 59

## 2023-09-18 PROCEDURE — 93005 ELECTROCARDIOGRAM TRACING: CPT

## 2023-09-18 PROCEDURE — 73610 XR ANKLE COMPLETE 3 VIEW RIGHT: ICD-10-PCS | Mod: 26,RT,, | Performed by: RADIOLOGY

## 2023-09-18 PROCEDURE — 73610 X-RAY EXAM OF ANKLE: CPT | Mod: 26,RT,, | Performed by: RADIOLOGY

## 2023-09-18 PROCEDURE — 93010 ELECTROCARDIOGRAM REPORT: CPT | Mod: ,,, | Performed by: INTERNAL MEDICINE

## 2023-09-18 PROCEDURE — 93010 EKG 12-LEAD: ICD-10-PCS | Mod: ,,, | Performed by: INTERNAL MEDICINE

## 2023-09-18 PROCEDURE — 99284 EMERGENCY DEPT VISIT MOD MDM: CPT | Mod: 25

## 2023-09-18 PROCEDURE — 29515 APPLICATION SHORT LEG SPLINT: CPT | Mod: RT

## 2023-09-18 RX ORDER — FENTANYL CITRATE 50 UG/ML
75 INJECTION, SOLUTION INTRAMUSCULAR; INTRAVENOUS
Status: COMPLETED | OUTPATIENT
Start: 2023-09-18 | End: 2023-09-18

## 2023-09-18 RX ORDER — KETOROLAC TROMETHAMINE 30 MG/ML
15 INJECTION, SOLUTION INTRAMUSCULAR; INTRAVENOUS
Status: COMPLETED | OUTPATIENT
Start: 2023-09-18 | End: 2023-09-18

## 2023-09-18 RX ORDER — HYDROCODONE BITARTRATE AND ACETAMINOPHEN 5; 325 MG/1; MG/1
1 TABLET ORAL EVERY 6 HOURS PRN
Qty: 12 TABLET | Refills: 0 | Status: SHIPPED | OUTPATIENT
Start: 2023-09-18

## 2023-09-18 RX ADMIN — KETOROLAC TROMETHAMINE 15 MG: 30 INJECTION, SOLUTION INTRAMUSCULAR; INTRAVENOUS at 01:09

## 2023-09-18 RX ADMIN — SODIUM CHLORIDE 1000 ML: 9 INJECTION, SOLUTION INTRAVENOUS at 01:09

## 2023-09-18 RX ADMIN — FENTANYL CITRATE 75 MCG: 50 INJECTION, SOLUTION INTRAMUSCULAR; INTRAVENOUS at 01:09

## 2023-09-18 NOTE — ED PROVIDER NOTES
Encounter Date: 9/18/2023       History     Chief Complaint   Patient presents with    Ankle Pain     Pt. States she fell while at home and injured the right ankle. Swelling and bruising noted. Distal pulse palpable.      77-year-old female presenting for evaluation right ankle pain after mechanical fall while taking the garbage can out just prior to arrival.  She denies hitting her head, denies associated LOC. right ankle pain is worsened with movement and improves with rest.  She denies motor or sensory deficits.    The history is provided by the patient. No  was used.     Review of patient's allergies indicates:  No Known Allergies  No past medical history on file.  No past surgical history on file.  No family history on file.     Review of Systems   Constitutional: Negative.    HENT: Negative.     Eyes: Negative.    Respiratory: Negative.     Cardiovascular: Negative.    Gastrointestinal: Negative.    Endocrine: Negative.    Genitourinary: Negative.    Musculoskeletal:  Positive for arthralgias and joint swelling.   Skin:         Swelling medial and lateral right ankle   Neurological: Negative.    Psychiatric/Behavioral: Negative.     All other systems reviewed and are negative.      Physical Exam     Initial Vitals [09/18/23 1255]   BP Pulse Resp Temp SpO2   (!) 109/47 (!) 55 18 97.4 °F (36.3 °C) 96 %      MAP       --         Physical Exam    Abdominal: Abdomen is soft. Bowel sounds are normal.   Musculoskeletal:      Comments: Swelling medial and lateral right ankle, active passive range of motion limited secondary to pain.  PT and DP pulses intact     Neurological: She is alert.   Skin: Skin is warm.   Swelling to the medial and lateral right ankle         ED Course   Procedures  Labs Reviewed - No data to display       Imaging Results               X-Ray Ankle Complete Right (Final result)  Result time 09/18/23 13:25:31      Final result by Venkata Gan MD (09/18/23 13:25:31)      Reviewed OPTIONS including AVASTIN/EYLEA/LUCENTIS and patient wants to proceed with EYLEA treatment AND REPEAT EVERY 5 WKS X 2 - MILD EDEMA - NEW.               Impression:      1. Displaced bimalleolar fracture.  2. Suspected nondisplaced vertically orientated articular fracture involving the distal metadiaphysis of the tibia.  3. Mild medial subluxation of the distal tibia with hindfoot valgus.  This report was flagged in Epic as abnormal.      Electronically signed by: Venkata Gan  Date:    09/18/2023  Time:    13:25               Narrative:    EXAMINATION:  jXR ANKLE COMPLETE 3 VIEW RIGHT    CLINICAL HISTORY:  Injury, unspecified, initial encounter    TECHNIQUE:  AP, lateral, and oblique images of the right ankle were performed.    COMPARISON:  None    FINDINGS:  There is a mildly displaced comminuted oblique fracture involving the distal metadiaphysis of the fibula.  There is overlying soft tissue swelling.  There is a displaced oblique fracture involving the base of the medial malleolus.  Prominent overlying soft tissue swelling.  There is mild medial subluxation of the distal tibia in relation to the talar dome with mild hindfoot valgus and mild asymmetric narrowing of the tibiotalar joint space.    There is a subtle vertical lucency involving the distal metadiaphysis of the tibia suspicious for a nondisplaced fracture.  This extends to the tibiotalar articulation.    Visualized tarsal bones are intact.                                       Medications   ketorolac injection 15 mg (15 mg Intravenous Given 9/18/23 1305)   sodium chloride 0.9% bolus 1,000 mL 1,000 mL (1,000 mLs Intravenous New Bag 9/18/23 1306)   fentaNYL 50 mcg/mL injection 75 mcg (75 mcg Intravenous Given 9/18/23 1358)     Medical Decision Making  77-year-old female with right ankle injury.  PD/DP pulses intact  X-rays of the ankle shows displaced bimalleolar fracture, distal oblique comminuted fracture of the distal fibula, possible fracture involving the tibiotalar articular surface.Posterior splint placed, and pedal pulses remain intact.  The patient will be nonweightbearing.   Ortho on-call Dr. Flynn notified and who will see patient in the morning.  Patient was seen and reevaluated. Patient's symptoms seem to be stable. I discussed the patient's diagnosis, treatment plan, and plan for discharge with the patient. Patient was instructed to follow up with ortho and was given strict return precautions to the ED. The patient voiced understanding and agreed with the plan        Amount and/or Complexity of Data Reviewed  Independent Historian: parent  External Data Reviewed: radiology.  Radiology: ordered.    Risk  Prescription drug management.                               Clinical Impression:   Final diagnoses:  [T14.90XA] Trauma  [R53.1] Weakness  [S82.891A] Closed fracture of right ankle, initial encounter (Primary)  [S82.842A] Bimalleolar fracture of left ankle, closed, initial encounter        ED Disposition Condition    Discharge Stable          ED Prescriptions       Medication Sig Dispense Start Date End Date Auth. Provider    HYDROcodone-acetaminophen (NORCO) 5-325 mg per tablet Take 1 tablet by mouth every 6 (six) hours as needed for Pain. 12 tablet 9/18/2023 -- Jay Hanson MD          Follow-up Information       Follow up With Specialties Details Why Contact Info    Robert Witt MD Orthopedic Surgery  As needed [address] [place] [address] [address]  [facility] [address]  [facility] [address]  [phone]               Jay Hanosn MD  09/18/23 1770

## 2023-09-19 ENCOUNTER — TELEPHONE (OUTPATIENT)
Dept: ORTHOPEDICS | Facility: CLINIC | Age: 78
End: 2023-09-19

## 2023-09-19 ENCOUNTER — OFFICE VISIT (OUTPATIENT)
Dept: ORTHOPEDICS | Facility: CLINIC | Age: 78
End: 2023-09-19
Payer: MEDICARE

## 2023-09-19 ENCOUNTER — NURSE TRIAGE (OUTPATIENT)
Dept: ADMINISTRATIVE | Facility: CLINIC | Age: 78
End: 2023-09-19
Payer: MEDICARE

## 2023-09-19 ENCOUNTER — ANESTHESIA EVENT (OUTPATIENT)
Dept: SURGERY | Facility: HOSPITAL | Age: 78
End: 2023-09-19
Payer: MEDICARE

## 2023-09-19 ENCOUNTER — HOSPITAL ENCOUNTER (OUTPATIENT)
Dept: PREADMISSION TESTING | Facility: HOSPITAL | Age: 78
Discharge: HOME | End: 2023-09-19
Attending: ORTHOPAEDIC SURGERY
Payer: MEDICARE

## 2023-09-19 ENCOUNTER — HOSPITAL ENCOUNTER (OUTPATIENT)
Dept: RADIOLOGY | Facility: HOSPITAL | Age: 78
Discharge: HOME | End: 2023-09-19
Attending: ORTHOPAEDIC SURGERY
Payer: MEDICARE

## 2023-09-19 VITALS — BODY MASS INDEX: 23.3 KG/M2 | WEIGHT: 145 LBS | RESPIRATION RATE: 16 BRPM | HEIGHT: 66 IN

## 2023-09-19 DIAGNOSIS — S82.891A CLOSED FRACTURE OF RIGHT ANKLE, INITIAL ENCOUNTER: ICD-10-CM

## 2023-09-19 DIAGNOSIS — S82.871A CLOSED TRAUMATIC DISPLACED FRACTURE OF RIGHT TIBIAL PLAFOND, INITIAL ENCOUNTER: ICD-10-CM

## 2023-09-19 DIAGNOSIS — S82.841A CLOSED DISPLACED BIMALLEOLAR FRACTURE OF RIGHT ANKLE, INITIAL ENCOUNTER: Primary | ICD-10-CM

## 2023-09-19 DIAGNOSIS — R60.0 LOCALIZED EDEMA: ICD-10-CM

## 2023-09-19 DIAGNOSIS — Z01.818 PRE-OP TESTING: ICD-10-CM

## 2023-09-19 DIAGNOSIS — S82.841A CLOSED DISPLACED BIMALLEOLAR FRACTURE OF RIGHT ANKLE, INITIAL ENCOUNTER: ICD-10-CM

## 2023-09-19 PROCEDURE — 99213 OFFICE O/P EST LOW 20 MIN: CPT | Mod: PBBFAC,PN,25 | Performed by: ORTHOPAEDIC SURGERY

## 2023-09-19 PROCEDURE — 99999 PR PBB SHADOW E&M-EST. PATIENT-LVL III: ICD-10-PCS | Mod: PBBFAC,,, | Performed by: ORTHOPAEDIC SURGERY

## 2023-09-19 PROCEDURE — 73700 CT ANKLE (INCLUDING HINDFOOT) WITHOUT CONTRAST RIGHT: ICD-10-PCS | Mod: 26,RT,, | Performed by: RADIOLOGY

## 2023-09-19 PROCEDURE — 99204 PR OFFICE/OUTPT VISIT, NEW, LEVL IV, 45-59 MIN: ICD-10-PCS | Mod: 57,S$PBB,, | Performed by: ORTHOPAEDIC SURGERY

## 2023-09-19 PROCEDURE — 99999 PR PBB SHADOW E&M-EST. PATIENT-LVL III: CPT | Mod: PBBFAC,,, | Performed by: ORTHOPAEDIC SURGERY

## 2023-09-19 PROCEDURE — 73700 CT LOWER EXTREMITY W/O DYE: CPT | Mod: TC,RT

## 2023-09-19 PROCEDURE — 73700 CT LOWER EXTREMITY W/O DYE: CPT | Mod: 26,RT,, | Performed by: RADIOLOGY

## 2023-09-19 PROCEDURE — 99204 OFFICE O/P NEW MOD 45 MIN: CPT | Mod: 57,S$PBB,, | Performed by: ORTHOPAEDIC SURGERY

## 2023-09-19 PROCEDURE — 99900103 DSU ONLY-NO CHARGE-INITIAL HR (STAT)

## 2023-09-19 RX ORDER — PRAVASTATIN SODIUM 10 MG/1
TABLET ORAL DAILY
COMMUNITY

## 2023-09-19 NOTE — PROGRESS NOTES
Subjective:      Patient ID: Katy Woodruff is a 77 y.o. female.    Chief Complaint: Injury and Pain of the Right Ankle    Referring Provider: Jay Hanson Md  [address] [facility].  [facility] [address]  [place] [address]    HPI:  Ms. Rojo is a 77-year-old lady who presented today for evaluation of 1 day of right ankle pain which began on 2023 when she was bringing her trash can to the side of her house and slipped and fell causing severe pain in her ankle.  She was brought to the emergency room by a relative and after x-rays were taken which showed an ankle fracture she was placed in a splint by the ER physician.  She denied new onset numbness and tingling in her foot.  She has been elevating her foot.  She has been ambulating with a knee scooter.    Past medical history:   Hyperlipidemia  IBS   Glaucoma  History of hepatitis A    Past surgical history:   Colonoscopy   Bilateral cataract excision   Glaucoma surgery left eye   Sinuses    Review of patient's allergies indicates:  No Known Allergies    Social History     Occupational History    Retired  with [employer]   Tobacco Use    Smoking status: Denied    Smokeless tobacco: Denied   Substance and Sexual Activity    Alcohol use: Denied    Drug use: Denied    Sexual activity: Not on file      Family history:   Father:  , construction accident.    Mother: , old age.    Sister:  1, alive, denied medical problems.    Previous Hospitalizations:  Denied previous hospitalizations.    ROS:   Review of Systems   Constitutional: Negative for chills and fever.   HENT:  Negative for congestion.    Eyes:  Negative for blurred vision and double vision.   Cardiovascular:  Negative for chest pain and cyanosis.   Respiratory:  Negative for cough and shortness of breath.    Endocrine: Negative for polydipsia.   Hematologic/Lymphatic: Negative for adenopathy.   Skin:  Negative for flushing, itching and skin cancer.    Musculoskeletal:  Positive for joint pain and joint swelling. Negative for gout.   Gastrointestinal:  Negative for constipation, diarrhea and heartburn.   Genitourinary:  Negative for nocturia.   Neurological:  Negative for headaches and seizures.   Psychiatric/Behavioral:  Negative for depression and substance abuse. The patient is not nervous/anxious.    Allergic/Immunologic: Negative for environmental allergies.           Objective:      Physical Exam:   General: AAOx3.  No acute distress  HEENT: Normocephalic, PEARLA EOMI, Good Dentition  Neck: Supple, No JVD  Chest: Symetric, equal excursion on inspiration  Abdomen: Soft NTND  Vascular:  Pulses intact and equal bilaterally.  Capillary refill less than 3 seconds and equal bilaterally  Neurologic:  Pinprick and soft touch intact and equal bilaterally  Integment:  Medial and lateral ankle ecchymosis, fracture blisters at medial ankle.  Abrasion medial great toe eminence  Extremity:  Ankle/foot:  Dorsiflexion/plantar flexion decreased right ankle.  Drawer with increased excursion right ankle.  Crepitus with motion right ankle.  Tender with palpation right ankle.  Swelling right ankle.  Nontender at the Lisfranc interval bilaterally.  Nontender at the Achilles insertion on the calcaneus bilaterally.  Achilles palpable throughout full distribution bilaterally.  Nontender at the plantar fascia insertion on the calcaneus.  Good toe motion with minimal pain.  Radiography:  Personally reviewed x-rays of the right ankle completed on 09/18/2023 showed a displaced bimalleolar fracture with a nondisplaced distal tibial plafond fracture.      Assessment:       Impression:      1. Closed displaced bimalleolar fracture of right ankle, initial encounter    2. Closed traumatic displaced fracture of right tibial plafond, initial encounter    3. Right ankle pain         Plan:       1.  Discussed physical examination and radiographic findings with the patient. Katy understands  that she has a displaced bimalleolar right ankle fracture with a questionable tibial plafond fracture.  Treatment alternatives and outcomes were discussed with the patient she understands she could be treated conservatively with observation, activity modification, NSAIDs, bracing, physical therapy, traction, or she could consider surgical intervention such as ORIF of her ankle.  She states she would like to proceed with surgery.    2. Possible complications of surgery to include bleeding, infection, scarring, nerve/blood vessel/tendon damage, need for further surgery, failed surgery, failure to improve, possible persistent pain, possible arthritis, possible fracture, possible hardware failure, possible hardware breakage, and possible future amputation were discussed with the patient.  The patient was permitted to ask questions and all concerns were addressed to her satisfaction.    3. Consent for open reduction internal fixation right ankle.    4. Tentatively schedule surgery for 09/25/2023.    5. Patient already has pain medications from the ER if she needs pain medications will be prescribed on her date of surgery or earlier if she needs them.    6. Keflex 500 mg, 1 p.o. q.i.d., dispense 20, refill 0, prescription was forwarded to the patient's pharmacy by [facility].  She understands these are for postop use.    7. Refer for a CT scan of the right ankle to evaluate the tibial plateau plafond fracture and to plan surgical intervention.  8. Cam walker right ankle, 1 was fitted to the patient she understands she should wear it at all times and treat it like a cast and only remove it for hygiene she can place her splint on her ankle when showering and then pattern leg dry and place her Cam walker back on.    9. Elevate and ice right ankle.    10. Walker a prescription was given to the patient.    11. Follow up 10-12 days postop

## 2023-09-19 NOTE — H&P (VIEW-ONLY)
Chief Complaint: Injury and Pain of the Right Ankle    HPI:  Ms. Rojo is a 77-year-old lady who presented today for evaluation of 1 day of right ankle pain which began on 2023 when she was bringing her trash can to the side of her house and slipped and fell causing severe pain in her ankle.  She was brought to the emergency room by a relative and after x-rays were taken which showed an ankle fracture she was placed in a splint by the ER physician.  She denied new onset numbness and tingling in her foot.  She has been elevating her foot.  She has been ambulating with a knee scooter.    Past medical history:   Hyperlipidemia  IBS   Glaucoma  History of hepatitis A    Past surgical history:   Colonoscopy   Bilateral cataract excision   Glaucoma surgery left eye   Sinuses    Review of patient's allergies indicates:  No Known Allergies    Social History     Occupational History    Retired  with [employer]   Tobacco Use    Smoking status: Denied    Smokeless tobacco: Denied   Substance and Sexual Activity    Alcohol use: Denied    Drug use: Denied    Sexual activity: Not on file      Family history:   Father:  , construction accident.    Mother: , old age.    Sister:  1, alive, denied medical problems.    Previous Hospitalizations:  Denied previous hospitalizations.    ROS:   Review of Systems   Constitutional: Negative for chills and fever.   HENT:  Negative for congestion.    Eyes:  Negative for blurred vision and double vision.   Cardiovascular:  Negative for chest pain and cyanosis.   Respiratory:  Negative for cough and shortness of breath.    Endocrine: Negative for polydipsia.   Hematologic/Lymphatic: Negative for adenopathy.   Skin:  Negative for flushing, itching and skin cancer.   Musculoskeletal:  Positive for joint pain and joint swelling. Negative for gout.   Gastrointestinal:  Negative for constipation, diarrhea and heartburn.   Genitourinary:   Negative for nocturia.   Neurological:  Negative for headaches and seizures.   Psychiatric/Behavioral:  Negative for depression and substance abuse. The patient is not nervous/anxious.    Allergic/Immunologic: Negative for environmental allergies.           Objective:      Physical Exam:   General: AAOx3.  No acute distress  HEENT: Normocephalic, PEARLA EOMI, Good Dentition  Neck: Supple, No JVD  Chest: Symetric, equal excursion on inspiration  Abdomen: Soft NTND  Vascular:  Pulses intact and equal bilaterally.  Capillary refill less than 3 seconds and equal bilaterally  Neurologic:  Pinprick and soft touch intact and equal bilaterally  Integment:  Medial and lateral ankle ecchymosis, fracture blisters at medial ankle.  Abrasion medial great toe eminence  Extremity:  Ankle/foot:  Dorsiflexion/plantar flexion decreased right ankle.  Drawer with increased excursion right ankle.  Crepitus with motion right ankle.  Tender with palpation right ankle.  Swelling right ankle.  Nontender at the Lisfranc interval bilaterally.  Nontender at the Achilles insertion on the calcaneus bilaterally.  Achilles palpable throughout full distribution bilaterally.  Nontender at the plantar fascia insertion on the calcaneus.  Good toe motion with minimal pain.  Radiography:  Personally reviewed x-rays of the right ankle completed on 09/18/2023 showed a displaced bimalleolar fracture with a nondisplaced distal tibial plafond fracture.      Assessment:       Impression:      1. Closed displaced bimalleolar fracture of right ankle, initial encounter    2. Closed traumatic displaced fracture of right tibial plafond, initial encounter    3. Right ankle pain         Plan:       1.  Discussed physical examination and radiographic findings with the patient. Katy understands that she has a displaced bimalleolar right ankle fracture with a questionable tibial plafond fracture.  Treatment alternatives and outcomes were discussed with the patient she  understands she could be treated conservatively with observation, activity modification, NSAIDs, bracing, physical therapy, traction, or she could consider surgical intervention such as ORIF of her ankle.  She states she would like to proceed with surgery.    2. Possible complications of surgery to include bleeding, infection, scarring, nerve/blood vessel/tendon damage, need for further surgery, failed surgery, failure to improve, possible persistent pain, possible arthritis, possible fracture, possible hardware failure, possible hardware breakage, and possible future amputation were discussed with the patient.  The patient was permitted to ask questions and all concerns were addressed to her satisfaction.    3. Consent for open reduction internal fixation right ankle.    4. Tentatively schedule surgery for 09/25/2023.    5. Patient already has pain medications from the ER if she needs pain medications will be prescribed on her date of surgery or earlier if she needs them.    6. Keflex 500 mg, 1 p.o. q.i.d., dispense 20, refill 0, prescription was forwarded to the patient's pharmacy by [facility].  She understands these are for postop use.    7. Refer for a CT scan of the right ankle to evaluate the tibial plateau plafond fracture and to plan surgical intervention.  8. Cam walker right ankle, 1 was fitted to the patient she understands she should wear it at all times and treat it like a cast and only remove it for hygiene she can place her splint on her ankle when showering and then pattern leg dry and place her Cam walker back on.    9. Elevate and ice right ankle.    10. Walker a prescription was given to the patient.    11. Follow up 10-12 days postop

## 2023-09-19 NOTE — ANESTHESIA PREPROCEDURE EVALUATION
09/19/2023  Katy Woodruff is a 77 y.o., female.      Pre-op Assessment    I have reviewed the Patient Summary Reports.     I have reviewed the Nursing Notes. I have reviewed the NPO Status.   I have reviewed the Medications.     Review of Systems  Anesthesia Hx:  No problems with previous Anesthesia  Neg history of prior surgery. Denies Family Hx of Anesthesia complications.   Denies Personal Hx of Anesthesia complications.   Social:  Non-Smoker    Hematology/Oncology:  Hematology Normal   Oncology Normal     EENT/Dental:EENT/Dental Normal   Cardiovascular:  Cardiovascular Normal     Pulmonary:  Pulmonary Normal    Renal/:  Renal/ Normal     Hepatic/GI:  Hepatic/GI Normal IBS   Musculoskeletal:  Musculoskeletal Normal    Neurological:  Neurology Normal    Endocrine:  Endocrine Normal    Dermatological:  Skin Normal    Psych:  Psychiatric Normal           Physical Exam  General: Alert    Airway:  Mallampati: II   Mouth Opening: Normal  TM Distance: Normal  Neck ROM: Normal ROM    Dental:  Intact    Chest/Lungs:  Clear to auscultation, Normal Respiratory Rate    Heart:  Rate: Normal        Anesthesia Plan  Type of Anesthesia, risks & benefits discussed:    Anesthesia Type: Gen ETT  Post Op Pain Control Plan: multimodal analgesia  Airway Plan: Direct, Post-Induction  Informed Consent: Informed consent signed with the Patient and all parties understand the risks and agree with anesthesia plan.  All questions answered. Patient consented to blood products? No  ASA Score: 2  Day of Surgery Review of History & Physical: H&P Update referred to the surgeon/provider.    Ready For Surgery From Anesthesia Perspective.     .

## 2023-09-19 NOTE — H&P
Chief Complaint: Injury and Pain of the Right Ankle    HPI:  Ms. Rojo is a 77-year-old lady who presented today for evaluation of 1 day of right ankle pain which began on 2023 when she was bringing her trash can to the side of her house and slipped and fell causing severe pain in her ankle.  She was brought to the emergency room by a relative and after x-rays were taken which showed an ankle fracture she was placed in a splint by the ER physician.  She denied new onset numbness and tingling in her foot.  She has been elevating her foot.  She has been ambulating with a knee scooter.    Past medical history:   Hyperlipidemia  IBS   Glaucoma  History of hepatitis A    Past surgical history:   Colonoscopy   Bilateral cataract excision   Glaucoma surgery left eye   Sinuses    Review of patient's allergies indicates:  No Known Allergies    Social History     Occupational History    Retired  with [employer]   Tobacco Use    Smoking status: Denied    Smokeless tobacco: Denied   Substance and Sexual Activity    Alcohol use: Denied    Drug use: Denied    Sexual activity: Not on file      Family history:   Father:  , construction accident.    Mother: , old age.    Sister:  1, alive, denied medical problems.    Previous Hospitalizations:  Denied previous hospitalizations.    ROS:   Review of Systems   Constitutional: Negative for chills and fever.   HENT:  Negative for congestion.    Eyes:  Negative for blurred vision and double vision.   Cardiovascular:  Negative for chest pain and cyanosis.   Respiratory:  Negative for cough and shortness of breath.    Endocrine: Negative for polydipsia.   Hematologic/Lymphatic: Negative for adenopathy.   Skin:  Negative for flushing, itching and skin cancer.   Musculoskeletal:  Positive for joint pain and joint swelling. Negative for gout.   Gastrointestinal:  Negative for constipation, diarrhea and heartburn.   Genitourinary:   Negative for nocturia.   Neurological:  Negative for headaches and seizures.   Psychiatric/Behavioral:  Negative for depression and substance abuse. The patient is not nervous/anxious.    Allergic/Immunologic: Negative for environmental allergies.           Objective:      Physical Exam:   General: AAOx3.  No acute distress  HEENT: Normocephalic, PEARLA EOMI, Good Dentition  Neck: Supple, No JVD  Chest: Symetric, equal excursion on inspiration  Abdomen: Soft NTND  Vascular:  Pulses intact and equal bilaterally.  Capillary refill less than 3 seconds and equal bilaterally  Neurologic:  Pinprick and soft touch intact and equal bilaterally  Integment:  Medial and lateral ankle ecchymosis, fracture blisters at medial ankle.  Abrasion medial great toe eminence  Extremity:  Ankle/foot:  Dorsiflexion/plantar flexion decreased right ankle.  Drawer with increased excursion right ankle.  Crepitus with motion right ankle.  Tender with palpation right ankle.  Swelling right ankle.  Nontender at the Lisfranc interval bilaterally.  Nontender at the Achilles insertion on the calcaneus bilaterally.  Achilles palpable throughout full distribution bilaterally.  Nontender at the plantar fascia insertion on the calcaneus.  Good toe motion with minimal pain.  Radiography:  Personally reviewed x-rays of the right ankle completed on 09/18/2023 showed a displaced bimalleolar fracture with a nondisplaced distal tibial plafond fracture.      Assessment:       Impression:      1. Closed displaced bimalleolar fracture of right ankle, initial encounter    2. Closed traumatic displaced fracture of right tibial plafond, initial encounter    3. Right ankle pain         Plan:       1.  Discussed physical examination and radiographic findings with the patient. Katy understands that she has a displaced bimalleolar right ankle fracture with a questionable tibial plafond fracture.  Treatment alternatives and outcomes were discussed with the patient she  understands she could be treated conservatively with observation, activity modification, NSAIDs, bracing, physical therapy, traction, or she could consider surgical intervention such as ORIF of her ankle.  She states she would like to proceed with surgery.    2. Possible complications of surgery to include bleeding, infection, scarring, nerve/blood vessel/tendon damage, need for further surgery, failed surgery, failure to improve, possible persistent pain, possible arthritis, possible fracture, possible hardware failure, possible hardware breakage, and possible future amputation were discussed with the patient.  The patient was permitted to ask questions and all concerns were addressed to her satisfaction.    3. Consent for open reduction internal fixation right ankle.    4. Tentatively schedule surgery for 09/25/2023.    5. Patient already has pain medications from the ER if she needs pain medications will be prescribed on her date of surgery or earlier if she needs them.    6. Keflex 500 mg, 1 p.o. q.i.d., dispense 20, refill 0, prescription was forwarded to the patient's pharmacy by [facility].  She understands these are for postop use.    7. Refer for a CT scan of the right ankle to evaluate the tibial plateau plafond fracture and to plan surgical intervention.  8. Cam walker right ankle, 1 was fitted to the patient she understands she should wear it at all times and treat it like a cast and only remove it for hygiene she can place her splint on her ankle when showering and then pattern leg dry and place her Cam walker back on.    9. Elevate and ice right ankle.    10. Walker a prescription was given to the patient.    11. Follow up 10-12 days postop

## 2023-09-19 NOTE — TELEPHONE ENCOUNTER
I contacted the patient and stated her antibiotic had been called into the pharmacy below. The patient stated understanding.    I called the follow prescription in to [facility] in [place] per Dr. Flynn's office note:  Keflex 500 mg, 1 p.o. q.i.d., dispense 20, refill 0.    ----- Message from Carlos Correa sent at 9/19/2023  1:56 PM CDT -----  Contact: pt  Type: Needs Medical Advice  Who Called:  pt  Best Call Back Number: [phone]    Additional Information: Pt was just at the office waiting for her pain pills and antibiotic to get sent in to   [id number] - [place] [address] [address] [address]  Phone: [phone] Fax: [phone]      Please call back and advise.

## 2023-09-20 NOTE — TELEPHONE ENCOUNTER
I contacted the patient in regard to her nurse triage message. The patient stated the blisters on her ankle are larger and more painful than yesterday. I stated per the provider that blisters after a fracture are common and for her to rest, ice, and elevate her ankle. The patient stated understanding and that she would call back if the pain became any worse. I stated understanding and that we would see her if she felt she needed to be seen in office. The patient declined at this time as it is difficult for her to get to the office due to her injury.

## 2023-09-20 NOTE — TELEPHONE ENCOUNTER
"Pt reports has a foot boot on from breaking her foot, having blisters on foot that are getting large and painful, wanting to know what she can do for them. Pt advised to see a MD within 24 hours per protocol via ED/UC/PCP office, Pt encouraged to call back with any worsening symptoms or questions and verbalized understanding.    Reason for Disposition   [1] Severe pain or large blister AND [2] caller wants doctor to drain blister    Additional Information   Negative: Sounds like a life-threatening emergency to the triager   Negative: Patient sounds very sick or weak to the triager   Negative: [1] Looks infected (spreading redness, red streak, pus) AND [2] fever   Negative: [1] Looks infected AND [2] large red area (> 2 in. or 5 cm)   Negative: [1] Foul-smelling odor from foot AND [2] new or increased   Negative: [1] Purple or black skin on foot or toe AND [2] new or increased   Negative: Looks like a boil or deep ulcer   Negative: [1] Looks infected (spreading redness, red streak, pus) AND [2] no fever   Negative: [1] Blister on foot AND [2] diabetes mellitus or peripheral vascular disease ("poor circulation")    Protocols used: Blister - Foot and Hand-A-AH    "

## 2023-09-25 ENCOUNTER — ANESTHESIA (OUTPATIENT)
Dept: SURGERY | Facility: HOSPITAL | Age: 78
End: 2023-09-25
Payer: MEDICARE

## 2023-09-25 ENCOUNTER — HOSPITAL ENCOUNTER (OUTPATIENT)
Facility: HOSPITAL | Age: 78
Discharge: HOME | End: 2023-09-25
Attending: ORTHOPAEDIC SURGERY | Admitting: ORTHOPAEDIC SURGERY
Payer: MEDICARE

## 2023-09-25 VITALS
DIASTOLIC BLOOD PRESSURE: 65 MMHG | TEMPERATURE: 99 F | RESPIRATION RATE: 11 BRPM | HEIGHT: 66 IN | SYSTOLIC BLOOD PRESSURE: 150 MMHG | OXYGEN SATURATION: 97 % | WEIGHT: 145 LBS | BODY MASS INDEX: 23.3 KG/M2 | HEART RATE: 71 BPM

## 2023-09-25 DIAGNOSIS — S82.841D CLOSED DISPLACED BIMALLEOLAR FRACTURE OF RIGHT ANKLE WITH ROUTINE HEALING, SUBSEQUENT ENCOUNTER: Primary | ICD-10-CM

## 2023-09-25 PROBLEM — S82.841A CLOSED DISPLACED BIMALLEOLAR FRACTURE OF RIGHT LOWER LEG: Status: ACTIVE | Noted: 2023-09-25

## 2023-09-25 PROCEDURE — D9220A PRA ANESTHESIA: ICD-10-PCS | Mod: CRNA,,, | Performed by: NURSE ANESTHETIST, CERTIFIED REGISTERED

## 2023-09-25 PROCEDURE — 71000016 HC POSTOP RECOV ADDL HR: Performed by: ORTHOPAEDIC SURGERY

## 2023-09-25 PROCEDURE — 63600175 PHARM REV CODE 636 W HCPCS: Performed by: NURSE ANESTHETIST, CERTIFIED REGISTERED

## 2023-09-25 PROCEDURE — 63600175 PHARM REV CODE 636 W HCPCS: Performed by: ANESTHESIOLOGY

## 2023-09-25 PROCEDURE — 71000033 HC RECOVERY, INTIAL HOUR: Performed by: ORTHOPAEDIC SURGERY

## 2023-09-25 PROCEDURE — C1713 ANCHOR/SCREW BN/BN,TIS/BN: HCPCS | Performed by: ORTHOPAEDIC SURGERY

## 2023-09-25 PROCEDURE — 27201423 OPTIME MED/SURG SUP & DEVICES STERILE SUPPLY: Performed by: ORTHOPAEDIC SURGERY

## 2023-09-25 PROCEDURE — 25000003 PHARM REV CODE 250: Performed by: NURSE ANESTHETIST, CERTIFIED REGISTERED

## 2023-09-25 PROCEDURE — 63600175 PHARM REV CODE 636 W HCPCS: Performed by: ORTHOPAEDIC SURGERY

## 2023-09-25 PROCEDURE — 37000008 HC ANESTHESIA 1ST 15 MINUTES: Performed by: ORTHOPAEDIC SURGERY

## 2023-09-25 PROCEDURE — C1769 GUIDE WIRE: HCPCS | Performed by: ORTHOPAEDIC SURGERY

## 2023-09-25 PROCEDURE — 71000015 HC POSTOP RECOV 1ST HR: Performed by: ORTHOPAEDIC SURGERY

## 2023-09-25 PROCEDURE — 25000003 PHARM REV CODE 250: Performed by: ORTHOPAEDIC SURGERY

## 2023-09-25 PROCEDURE — 27823 PR OPEN TX TRIMALLEOLAR ANKLE FX W FIX PST LIP: ICD-10-PCS | Mod: RT,,, | Performed by: ORTHOPAEDIC SURGERY

## 2023-09-25 PROCEDURE — 36000709 HC OR TIME LEV III EA ADD 15 MIN: Performed by: ORTHOPAEDIC SURGERY

## 2023-09-25 PROCEDURE — 27823 TREATMENT OF ANKLE FRACTURE: CPT | Mod: RT,,, | Performed by: ORTHOPAEDIC SURGERY

## 2023-09-25 PROCEDURE — 36000708 HC OR TIME LEV III 1ST 15 MIN: Performed by: ORTHOPAEDIC SURGERY

## 2023-09-25 PROCEDURE — D9220A PRA ANESTHESIA: ICD-10-PCS | Mod: ANES,,, | Performed by: ANESTHESIOLOGY

## 2023-09-25 PROCEDURE — D9220A PRA ANESTHESIA: Mod: CRNA,,, | Performed by: NURSE ANESTHETIST, CERTIFIED REGISTERED

## 2023-09-25 PROCEDURE — D9220A PRA ANESTHESIA: Mod: ANES,,, | Performed by: ANESTHESIOLOGY

## 2023-09-25 PROCEDURE — 37000009 HC ANESTHESIA EA ADD 15 MINS: Performed by: ORTHOPAEDIC SURGERY

## 2023-09-25 DEVICE — SCREW R3CON NLOK PLT 3.5X14MM: Type: IMPLANTABLE DEVICE | Site: ANKLE | Status: FUNCTIONAL

## 2023-09-25 DEVICE — IMPLANTABLE DEVICE: Type: IMPLANTABLE DEVICE | Site: ANKLE | Status: FUNCTIONAL

## 2023-09-25 DEVICE — PLATE FIB CLUSTER 9 HOLE RIGHT: Type: IMPLANTABLE DEVICE | Site: ANKLE | Status: FUNCTIONAL

## 2023-09-25 DEVICE — SCREW R3CON LOK PLATE 3.5X14MM: Type: IMPLANTABLE DEVICE | Site: ANKLE | Status: FUNCTIONAL

## 2023-09-25 DEVICE — SCREW R3CON LOK PLATE 3.5X16MM: Type: IMPLANTABLE DEVICE | Site: ANKLE | Status: FUNCTIONAL

## 2023-09-25 DEVICE — SCREW R3CON NLOK PLT 3.5X16MM: Type: IMPLANTABLE DEVICE | Site: ANKLE | Status: FUNCTIONAL

## 2023-09-25 DEVICE — SCREW R3CON LOK PLATE 3.5X12MM: Type: IMPLANTABLE DEVICE | Site: ANKLE | Status: FUNCTIONAL

## 2023-09-25 DEVICE — SCREW R3CON LOK PLATE 3.5X10MM: Type: IMPLANTABLE DEVICE | Site: ANKLE | Status: FUNCTIONAL

## 2023-09-25 RX ORDER — PROPOFOL 10 MG/ML
VIAL (ML) INTRAVENOUS
Status: DISCONTINUED | OUTPATIENT
Start: 2023-09-25 | End: 2023-09-25

## 2023-09-25 RX ORDER — SODIUM CHLORIDE, SODIUM LACTATE, POTASSIUM CHLORIDE, CALCIUM CHLORIDE 600; 310; 30; 20 MG/100ML; MG/100ML; MG/100ML; MG/100ML
INJECTION, SOLUTION INTRAVENOUS CONTINUOUS
Status: DISCONTINUED | OUTPATIENT
Start: 2023-09-25 | End: 2023-09-25 | Stop reason: HOSPADM

## 2023-09-25 RX ORDER — BACITRACIN 500 [USP'U]/G
OINTMENT TOPICAL
Status: DISCONTINUED | OUTPATIENT
Start: 2023-09-25 | End: 2023-09-25 | Stop reason: HOSPADM

## 2023-09-25 RX ORDER — ONDANSETRON 2 MG/ML
4 INJECTION INTRAMUSCULAR; INTRAVENOUS DAILY PRN
Status: DISCONTINUED | OUTPATIENT
Start: 2023-09-25 | End: 2023-09-25 | Stop reason: HOSPADM

## 2023-09-25 RX ORDER — MORPHINE SULFATE 2 MG/ML
2 INJECTION, SOLUTION INTRAMUSCULAR; INTRAVENOUS EVERY 5 MIN PRN
Status: DISCONTINUED | OUTPATIENT
Start: 2023-09-25 | End: 2023-09-25 | Stop reason: HOSPADM

## 2023-09-25 RX ORDER — KETOROLAC TROMETHAMINE 30 MG/ML
INJECTION, SOLUTION INTRAMUSCULAR; INTRAVENOUS
Status: DISCONTINUED | OUTPATIENT
Start: 2023-09-25 | End: 2023-09-25

## 2023-09-25 RX ORDER — DEXAMETHASONE SODIUM PHOSPHATE 4 MG/ML
INJECTION, SOLUTION INTRA-ARTICULAR; INTRALESIONAL; INTRAMUSCULAR; INTRAVENOUS; SOFT TISSUE
Status: DISCONTINUED | OUTPATIENT
Start: 2023-09-25 | End: 2023-09-25

## 2023-09-25 RX ORDER — DIPHENHYDRAMINE HYDROCHLORIDE 50 MG/ML
12.5 INJECTION INTRAMUSCULAR; INTRAVENOUS
Status: DISCONTINUED | OUTPATIENT
Start: 2023-09-25 | End: 2023-09-25 | Stop reason: HOSPADM

## 2023-09-25 RX ORDER — FENTANYL CITRATE 50 UG/ML
INJECTION, SOLUTION INTRAMUSCULAR; INTRAVENOUS
Status: DISCONTINUED | OUTPATIENT
Start: 2023-09-25 | End: 2023-09-25

## 2023-09-25 RX ORDER — ACETAMINOPHEN 10 MG/ML
INJECTION, SOLUTION INTRAVENOUS
Status: DISCONTINUED | OUTPATIENT
Start: 2023-09-25 | End: 2023-09-25

## 2023-09-25 RX ORDER — MUPIROCIN 20 MG/G
OINTMENT TOPICAL ONCE
Status: COMPLETED | OUTPATIENT
Start: 2023-09-25 | End: 2023-09-25

## 2023-09-25 RX ORDER — SUCCINYLCHOLINE CHLORIDE 20 MG/ML
INJECTION INTRAMUSCULAR; INTRAVENOUS
Status: DISCONTINUED | OUTPATIENT
Start: 2023-09-25 | End: 2023-09-25

## 2023-09-25 RX ORDER — MIDAZOLAM HYDROCHLORIDE 1 MG/ML
INJECTION, SOLUTION INTRAMUSCULAR; INTRAVENOUS
Status: DISCONTINUED | OUTPATIENT
Start: 2023-09-25 | End: 2023-09-25

## 2023-09-25 RX ORDER — ONDANSETRON 2 MG/ML
INJECTION INTRAMUSCULAR; INTRAVENOUS
Status: DISCONTINUED | OUTPATIENT
Start: 2023-09-25 | End: 2023-09-25

## 2023-09-25 RX ORDER — LIDOCAINE HYDROCHLORIDE 10 MG/ML
1 INJECTION, SOLUTION EPIDURAL; INFILTRATION; INTRACAUDAL; PERINEURAL ONCE
Status: DISCONTINUED | OUTPATIENT
Start: 2023-09-25 | End: 2023-09-25 | Stop reason: HOSPADM

## 2023-09-25 RX ORDER — LIDOCAINE HYDROCHLORIDE 20 MG/ML
INJECTION, SOLUTION EPIDURAL; INFILTRATION; INTRACAUDAL; PERINEURAL
Status: DISCONTINUED | OUTPATIENT
Start: 2023-09-25 | End: 2023-09-25

## 2023-09-25 RX ORDER — ROCURONIUM BROMIDE 10 MG/ML
INJECTION, SOLUTION INTRAVENOUS
Status: DISCONTINUED | OUTPATIENT
Start: 2023-09-25 | End: 2023-09-25

## 2023-09-25 RX ORDER — IBUPROFEN 200 MG
200 TABLET ORAL EVERY 6 HOURS PRN
COMMUNITY

## 2023-09-25 RX ORDER — SODIUM CHLORIDE, SODIUM LACTATE, POTASSIUM CHLORIDE, CALCIUM CHLORIDE 600; 310; 30; 20 MG/100ML; MG/100ML; MG/100ML; MG/100ML
125 INJECTION, SOLUTION INTRAVENOUS CONTINUOUS
Status: DISCONTINUED | OUTPATIENT
Start: 2023-09-25 | End: 2023-09-25 | Stop reason: HOSPADM

## 2023-09-25 RX ORDER — KETOROLAC TROMETHAMINE 30 MG/ML
15 INJECTION, SOLUTION INTRAMUSCULAR; INTRAVENOUS ONCE
Status: DISCONTINUED | OUTPATIENT
Start: 2023-09-25 | End: 2023-09-25 | Stop reason: HOSPADM

## 2023-09-25 RX ADMIN — KETOROLAC TROMETHAMINE 30 MG: 30 INJECTION, SOLUTION INTRAMUSCULAR at 02:09

## 2023-09-25 RX ADMIN — ACETAMINOPHEN 1000 MG: 10 INJECTION INTRAVENOUS at 02:09

## 2023-09-25 RX ADMIN — SODIUM CHLORIDE, POTASSIUM CHLORIDE, SODIUM LACTATE AND CALCIUM CHLORIDE: 600; 310; 30; 20 INJECTION, SOLUTION INTRAVENOUS at 03:09

## 2023-09-25 RX ADMIN — GLYCOPYRROLATE 0.4 MG: 0.2 INJECTION INTRAMUSCULAR; INTRAVENOUS at 02:09

## 2023-09-25 RX ADMIN — ROCURONIUM BROMIDE 30 MG: 10 INJECTION, SOLUTION INTRAVENOUS at 02:09

## 2023-09-25 RX ADMIN — ONDANSETRON 4 MG: 2 INJECTION INTRAMUSCULAR; INTRAVENOUS at 02:09

## 2023-09-25 RX ADMIN — MUPIROCIN: 20 OINTMENT TOPICAL at 12:09

## 2023-09-25 RX ADMIN — ONDANSETRON 4 MG: 2 INJECTION INTRAMUSCULAR; INTRAVENOUS at 04:09

## 2023-09-25 RX ADMIN — SUGAMMADEX 50 MG: 100 INJECTION, SOLUTION INTRAVENOUS at 03:09

## 2023-09-25 RX ADMIN — PROPOFOL 200 MG: 10 INJECTION, EMULSION INTRAVENOUS at 02:09

## 2023-09-25 RX ADMIN — SODIUM CHLORIDE, POTASSIUM CHLORIDE, SODIUM LACTATE AND CALCIUM CHLORIDE: 600; 310; 30; 20 INJECTION, SOLUTION INTRAVENOUS at 12:09

## 2023-09-25 RX ADMIN — CEFAZOLIN 2 G: 2 INJECTION, POWDER, FOR SOLUTION INTRAMUSCULAR; INTRAVENOUS at 01:09

## 2023-09-25 RX ADMIN — LIDOCAINE HYDROCHLORIDE 100 MG: 20 INJECTION, SOLUTION EPIDURAL; INFILTRATION; INTRACAUDAL; PERINEURAL at 02:09

## 2023-09-25 RX ADMIN — MIDAZOLAM HYDROCHLORIDE 2 MG: 1 INJECTION, SOLUTION INTRAMUSCULAR; INTRAVENOUS at 01:09

## 2023-09-25 RX ADMIN — DEXAMETHASONE SODIUM PHOSPHATE 4 MG: 4 INJECTION, SOLUTION INTRA-ARTICULAR; INTRALESIONAL; INTRAMUSCULAR; INTRAVENOUS; SOFT TISSUE at 02:09

## 2023-09-25 RX ADMIN — ROCURONIUM BROMIDE 20 MG: 10 INJECTION, SOLUTION INTRAVENOUS at 02:09

## 2023-09-25 RX ADMIN — SUCCINYLCHOLINE CHLORIDE 100 MG: 20 INJECTION, SOLUTION INTRAMUSCULAR; INTRAVENOUS at 02:09

## 2023-09-25 RX ADMIN — FENTANYL CITRATE 100 MCG: 50 INJECTION INTRAMUSCULAR; INTRAVENOUS at 02:09

## 2023-09-25 RX ADMIN — MORPHINE SULFATE 2 MG: 2 INJECTION, SOLUTION INTRAMUSCULAR; INTRAVENOUS at 04:09

## 2023-09-25 NOTE — PLAN OF CARE
Has met unit/department guidelines for discharge from each phase of the post procedure continuum. Leaving floor per w/c with RN. MARILEE x3. Resp even and unlabored room air. No distress noted. Tolerating Po fluids without c/o nausea/vomiting. Post-op dsg clean, dry and intact. Copy of dc paperwork and RX x1 handed to pts sister. Instructed pts sister pt may take a pain pill when needed. V/u. Ice pack refilled and sent home with pt. All personal belongings returned to pt.

## 2023-09-25 NOTE — ANESTHESIA PROCEDURE NOTES
Intubation    Date/Time: 9/25/2023 2:05 PM    Performed by: Dominic Hurtado CRNA  Authorized by: Jordan Dixon MD    Intubation:     Induction:  Rapid sequence induction    Intubated:  Postinduction    Mask Ventilation:  Easy mask    Attempts:  1    Attempted By:  CRNA    Method of Intubation:  Video laryngoscopy    Blade:  Romero 3    Laryngeal View Grade: Grade I - full view of cords      Difficult Airway Encountered?: No      Complications:  None    Airway Device:  Oral endotracheal tube    Airway Device Size:  7.0    Style/Cuff Inflation:  Cuffed    Tube secured:  22    Secured at:  The teeth    Placement Verified By:  Capnometry    Complicating Factors:  None    Findings Post-Intubation:  BS equal bilateral

## 2023-09-25 NOTE — OP NOTE
[facility]  Orthopedic Surgery    9/25/2023    Katy Woodruff  [id number]      PREOPERATIVE DIAGNOSIS: Closed displaced bimalleolar fracture of right ankle, initial encounter [S85.374G]    POSTOPERATIVE DIAGNOSIS:  Displaced trimalleolar fracture, right ankle.    PROCEDURE:  1. Open reduction and internal fixation lateral malleolus, right ankle, with 9 hole [place] 28 Gorilla pre contoured locking fibular plate and 9 bone screws.    2. Open reduction internal fixation medial malleolus, right ankle, with 2 [place] 28 Gorilla 4-0 cannulated screws.    3. Closed reduction and internal fixation posterior malleolus, right ankle, with 1 [place] 28 Gorilla 4-0 cannulated screw.  4. Short-leg posterior mold plaster splint, right ankle.      SURGEON: Salvador Flynn D.O.    ASSISTANT: Orton Grinnell, CFA    ANESTHESIA:  General.    BLOOD LOSS:  Less than 10 cc.    TOURNIQUET:  40 minutes.    DRAINS:  None.    PATHOLOGY:  Debrided fibrous tissue.    COMPLICATION:  None    INDICATIONS FOR PROCEDURE:   Ms. Rojo is a 77-year-old lady who has right ankle pain which began on 09/18/2023 when she was bringing her trash can to the side of her house and slipped and fell causing severe pain in her ankle.  She was brought to the emergency room by a relative and after x-rays were taken which showed an ankle fracture she was placed in a splint by the ER physician.  She denied new onset numbness and tingling in her foot.  She has been elevating her foot.  She has been ambulating with a knee scooter.   She elected to proceed with surgery after complications to include bleeding, infection, scarring, nerve/blood vessel/tendon damage, need for further surgery, failed surgery, failure to improve, stiffness, skin slough, nonunion, fracture, hardware failure, hardware breakage, arthritis, and possible amputation were discussed.  She signed a consent.    PROCEDURE IN DETAIL:   The patient was brought to the operating room and was  transferred to the operating bed where all bony prominences were well padded.  General anesthesia was then administered by the Anesthesiology Department.  After general anesthesia was administered a tourniquet was applied to the upper part of the patient's right lower extremity.  The patient's right leg was then prepped with chlorhexidine solution and draped in the normal sterile fashion.  After prepping and draping bony and soft tissue landmarks were identified and a longitudinal incision site over the lateral malleolus was drawn along with a curvilinear incision site over the medial malleolus.  The patient's leg was then elevated, exsanguinated, tourniquet was inflated.         Sharp incision was then made with a #15 blade at the lateral malleolus incision site and dissection was taken to the level of the lateral malleolus fracture while protecting vital structures.  The lateral malleolus fracture was freed from investing and fibrous tissue.  The fracture was then opened and fibrous tissue was curetted and also removed with a rongeur.  It was then copiously irrigated.  The fracture was then reduced and held in place with a lobster claw reduction clamp.  The reduction was checked with fluoroscopic orthogonal views and the patient was found to be in near anatomic alignment.  A plate was then chosen and placed on the lateral malleolus and held in place with 2 olive wires.  Plate placement was then checked with fluoroscopic orthogonal views and when the plate was found to be in the appropriate position with near anatomic alignment of the fracture screw holes were drilled measured and screws were placed.  Follow up wires were then removed and remaining screw holes were drilled measured and screws were placed.  Fracture alignment, plate placement and screw placement were then recheck with fluoroscopic orthogonal views and the patient was found to be in near anatomic alignment with good placement of the plate and screws.   The incision was then copiously irrigated and then packed with a moistened Ray-Thais.       Attention was then placed on the medial malleolus where sharp incision was made with a #15 blade followed by dissection to the level of the medial malleolus fracture while protecting vital structures.  The fracture was opened and fibrous tissue was removed with a rongeur and curette.  Once clean bone was obtained it was copiously irrigated.  A reduction was then completed with a pointed reduction clamp.  The reduction was checked with fluoroscopic orthogonal views and when the patient was found to be in near anatomic alignment 2 guidewires were placed from the distal medial malleolus across the fracture and into the proximal fragment under fluoroscopic orthogonal views.  Once the 2 pins were in place and in good position they were measured and then over-drilled.  Appropriate length screws were then placed on the guidewires and seated against the fragment compressing the fragment.  The guidewires were then removed and fluoroscopic orthogonal views were taken showing near anatomic alignment of the fracture with good placement of the screws.  The incision was then copiously irrigated and packed with a moist Ray-Thais.         Attention was then placed on the posterior malleolus fragment where a reduction was completed with dorsiflexion of the ankle.  A stab incision was made at the anterior ankle with a #15 blade followed by blunt dissection to the anterior cortex of the distal tibia with mosquito hemostat.  A guidewire was then advanced from the anterior ankle directed posterior laterally across the fracture under fluoroscopic orthogonal views.  Once the guidewire was in the appropriate position it was measured and then over-drilled.  An appropriate length screw was then placed and seated compressing the posterior malleolus fragment.  The guidewire was then removed and final fluoroscopic orthogonal views of the patient's ankle  were taken showing near anatomic alignment of all fractures with good placement of plates and screws.       The tourniquet was then released and the lateral malleolus incision site was inspected and full hemostasis was ensured after removal of the moistened Ray-Thais.  It was then closed in layers with Vicryl suture with final closure with nylon suture in a vertical mattress type of fashion.  The medial malleolus fracture site then had its Ray-Thais removed and full hemostasis ensured.  It was then closed in layers with Vicryl suture with final closure with nylon suture in a vertical mattress type of fashion.  The anterior incision at the distal tibia was then closed with nylon suture in a simple interrupted fashion.  The incisions were then dressed with Adaptic, sterile gauze, sterile cast padding followed by a posterior mold plaster splint and an Ace wrap.  She was then awakened by anesthesia and transferred from the operating room to the recovery room in stable condition.  She tolerated the procedures well without complication.

## 2023-09-25 NOTE — PLAN OF CARE
"Pt states, "I feel like I can't clear my throat." Explained to pt this is a normal sensation to experience after being intubated. Dr. Dixon to bedside to speak with pt re this concern. Pt states, "I feel better now." No new orders received.   "

## 2023-09-25 NOTE — ANESTHESIA POSTPROCEDURE EVALUATION
Anesthesia Post Evaluation    Patient: Katy Woodruff    Procedure(s) Performed: Procedure(s) (LRB):  ORIF, ANKLE (Right)    Final Anesthesia Type: general      Patient location during evaluation: PACU  Patient participation: Yes- Able to Participate  Level of consciousness: awake and alert  Post-procedure vital signs: reviewed and stable  Pain management: adequate  Airway patency: patent    PONV status at discharge: No PONV  Anesthetic complications: no      Cardiovascular status: blood pressure returned to baseline  Respiratory status: unassisted  Hydration status: euvolemic  Follow-up not needed.          Vitals Value Taken Time   /67 09/25/23 1618   Temp 37.1 °C (98.8 °F) 09/25/23 1555   Pulse 60 09/25/23 1618   Resp 67 09/25/23 1618   SpO2 99 % 09/25/23 1618   Vitals shown include unvalidated device data.      No case tracking events are documented in the log.      Pain/Roxanne Score: Roxanne Score: 9 (9/25/2023  4:10 PM)

## 2023-09-25 NOTE — TRANSFER OF CARE
"Anesthesia Transfer of Care Note    Patient: Katy Woodruff    Procedure(s) Performed: Procedure(s) (LRB):  ORIF, ANKLE (Right)    Patient location: PACU    Anesthesia Type: general    Transport from OR: Transported from OR on room air with adequate spontaneous ventilation    Post pain: adequate analgesia    Post assessment: no apparent anesthetic complications and tolerated procedure well    Post vital signs: stable    Level of consciousness: sedated and responds to stimulation    Nausea/Vomiting: no nausea/vomiting    Complications: none    Transfer of care protocol was followed      Last vitals:   Visit Vitals  BP (!) 152/78   Pulse 69   Temp 36.6 °C (97.8 °F)   Resp 17   Ht 5' 6" (1.676 m)   Wt 65.8 kg (145 lb)   SpO2 98%   BMI 23.40 kg/m²     "

## 2023-09-25 NOTE — DISCHARGE SUMMARY
[facility] Surgery  Discharge Note  Short Stay    Procedure(s) (LRB):  ORIF, ANKLE (Right)      OUTCOME: Patient tolerated treatment/procedure well without complication and is now ready for discharge.    DISPOSITION: Home or Self Care    FINAL DIAGNOSIS:  Displaced trimalleolar fracture, right ankle.    FOLLOWUP: In clinic    DISCHARGE INSTRUCTIONS:    Discharge Procedure Orders   Diet Adult Regular     Keep surgical extremity elevated     Ice to affected area     Other restrictions (specify):   Order Comments: 1. Elevate and ice right ankle.    2. Do not remove dressing, keep dressing clean and dry.    3. Walk with crutches/walker/knee scooter nonweightbearing to the right foot.  Must stay in a clean dry environment.     Notify your health care provider if you experience any of the following:  temperature >100.4     Leave dressing on - Keep it clean, dry, and intact until clinic visit   Order Comments: Do not remove dressing, keep dressing clean and dry.     Weight bearing restrictions (specify):   Order Comments: Walk with crutches/walker/knee scooter nonweightbearing to the right foot.         Clinical Reference Documents Added to Patient Instructions         Document    HOW TO PREVENT SURGICAL SITE INFECTIONS (ENGLISH)    OPEN REDUCTION AND INTERNAL FIXATION SURGERY DISCHARGE INSTRUCTIONS (ENGLISH)    POSTOPERATIVE PAIN DISCHARGE INSTRUCTIONS (ENGLISH)            TIME SPENT ON DISCHARGE: 30 minutes

## 2023-09-25 NOTE — DISCHARGE INSTRUCTIONS

## 2023-10-13 ENCOUNTER — OFFICE VISIT (OUTPATIENT)
Dept: ORTHOPEDICS | Facility: CLINIC | Age: 78
End: 2023-10-13
Payer: MEDICARE

## 2023-10-13 VITALS — BODY MASS INDEX: 23.3 KG/M2 | HEART RATE: 77 BPM | HEIGHT: 66 IN | OXYGEN SATURATION: 96 % | WEIGHT: 145 LBS

## 2023-10-13 DIAGNOSIS — Z48.02 VISIT FOR SUTURE REMOVAL: ICD-10-CM

## 2023-10-13 DIAGNOSIS — S82.851D CLOSED TRIMALLEOLAR FRACTURE OF ANKLE, RIGHT, WITH ROUTINE HEALING, SUBSEQUENT ENCOUNTER: Primary | ICD-10-CM

## 2023-10-13 PROCEDURE — 99999 PR PBB SHADOW E&M-EST. PATIENT-LVL III: CPT | Mod: PBBFAC,,, | Performed by: ORTHOPAEDIC SURGERY

## 2023-10-13 PROCEDURE — 99213 OFFICE O/P EST LOW 20 MIN: CPT | Mod: PBBFAC,PN | Performed by: ORTHOPAEDIC SURGERY

## 2023-10-13 PROCEDURE — 99024 PR POST-OP FOLLOW-UP VISIT: ICD-10-PCS | Mod: POP,,, | Performed by: ORTHOPAEDIC SURGERY

## 2023-10-13 PROCEDURE — 99024 POSTOP FOLLOW-UP VISIT: CPT | Mod: POP,,, | Performed by: ORTHOPAEDIC SURGERY

## 2023-10-13 PROCEDURE — 99999PBSHW PR PBB SHADOW TECHNICAL ONLY FILED TO HB: ICD-10-PCS | Mod: PBBFAC,,,

## 2023-10-13 PROCEDURE — 29405 PR APPLY SHORT LEG CAST: ICD-10-PCS | Mod: S$PBB,58,RT, | Performed by: ORTHOPAEDIC SURGERY

## 2023-10-13 PROCEDURE — 29405 APPL SHORT LEG CAST: CPT | Mod: S$PBB,58,RT, | Performed by: ORTHOPAEDIC SURGERY

## 2023-10-13 PROCEDURE — 99999PBSHW PR PBB SHADOW TECHNICAL ONLY FILED TO HB: Mod: PBBFAC,,,

## 2023-10-13 PROCEDURE — 99999 PR PBB SHADOW E&M-EST. PATIENT-LVL III: ICD-10-PCS | Mod: PBBFAC,,, | Performed by: ORTHOPAEDIC SURGERY

## 2023-10-13 PROCEDURE — 29405 APPL SHORT LEG CAST: CPT | Mod: PBBFAC,PN | Performed by: ORTHOPAEDIC SURGERY

## 2023-10-13 NOTE — PROGRESS NOTES
Subjective:      Patient ID: Katy Woodruff is a 77 y.o. female.    Chief Complaint: Post-op Evaluation of the Right Ankle      HPI:  Ms. Woodruff returned today for 1st postop visit after an ORIF of a trimalleolar right ankle fracture performed on 09/25/2023.  She states she is doing well and her pain is well controlled.  She has been in his splint since surgery.  She has been nonweightbearing with a knee scooter since surgery.  She injured her right ankle on 09/18/2023 when she was bringing her trash can to the side of her house and slipped and fell.     ROS:  New diagnosis/surgery/prescriptions since last office visit on 09/25/2023: ORIF trimalleolar right ankle fracture.  Constitutional: Negative for chills and fever.   HENT:  Negative for congestion.    Eyes:  Negative for blurred vision and double vision.   Cardiovascular:  Negative for chest pain and cyanosis.   Respiratory:  Negative for cough and shortness of breath.    Endocrine: Negative for polydipsia.   Hematologic/Lymphatic: Negative for adenopathy.   Skin:  Negative for flushing, itching and skin cancer.   Musculoskeletal:  Positive for joint pain and joint swelling. Negative for gout.   Gastrointestinal:  Negative for constipation, diarrhea and heartburn.   Genitourinary:  Negative for nocturia.   Neurological:  Negative for headaches and seizures.   Psychiatric/Behavioral:  Negative for depression and substance abuse. The patient is not nervous/anxious.    Allergic/Immunologic: Negative for environmental allergies.       Objective:      Physical Exam:   General: AAOx3.  No acute distress  Vascular:  Pulses intact and equal bilaterally.  Capillary refill less than 3 seconds and equal bilaterally  Neurologic:  Pinprick and soft touch intact and equal bilaterally  Integment:  Incisions well approximated with sutures in place.  Extremity:  Ankle:  Splint in good condition, with splint removed:  No swelling.  Nontender with palpation.  Relatively  nontender with motion within allowable.  Good toe motion without pain.  Radiography:  No new x-rays done today.      Assessment:       Impression:     1. ORIF trimalleolar right ankle fracture         Plan:       1.  Discussed physical examination with the patient. Katy understands that she had an open reduction internal fixation of a trimalleolar right ankle fracture and appears to be doing well.    2. Remove sutures and place Steri-Strips.    3. Place in a short-leg well-molded fiberglass cast.  4. Cast shoe right foot, 1 was fitted to the patient.  5. Any pain can be treated with over-the-counter medications dosed per box instructions.  6. Since the patient does not have a family doctor in the local vicinity refer to family Medicine.    7. Continue to elevate.  8. Continue to ambulate with a walker/crutches/knee scooter nonweightbearing to the right foot.  9. Cast care instructions were discussed and given.    10. Do not stick anything in cast, keep cast clean and dry.  11. Follow up in 1 month with an x-ray out of plaster right ankle.

## 2023-11-09 DIAGNOSIS — S82.851D CLOSED TRIMALLEOLAR FRACTURE OF ANKLE, RIGHT, WITH ROUTINE HEALING, SUBSEQUENT ENCOUNTER: Primary | ICD-10-CM

## 2023-11-15 ENCOUNTER — HOSPITAL ENCOUNTER (OUTPATIENT)
Dept: RADIOLOGY | Facility: HOSPITAL | Age: 78
Discharge: HOME | End: 2023-11-15
Attending: ORTHOPAEDIC SURGERY
Payer: MEDICARE

## 2023-11-15 ENCOUNTER — OFFICE VISIT (OUTPATIENT)
Dept: ORTHOPEDICS | Facility: CLINIC | Age: 78
End: 2023-11-15
Payer: MEDICARE

## 2023-11-15 VITALS — OXYGEN SATURATION: 98 % | BODY MASS INDEX: 23.3 KG/M2 | WEIGHT: 145 LBS | HEART RATE: 72 BPM | HEIGHT: 66 IN

## 2023-11-15 DIAGNOSIS — S82.851D CLOSED TRIMALLEOLAR FRACTURE OF ANKLE, RIGHT, WITH ROUTINE HEALING, SUBSEQUENT ENCOUNTER: ICD-10-CM

## 2023-11-15 DIAGNOSIS — S82.851D CLOSED TRIMALLEOLAR FRACTURE OF ANKLE, RIGHT, WITH ROUTINE HEALING, SUBSEQUENT ENCOUNTER: Primary | ICD-10-CM

## 2023-11-15 PROCEDURE — 73610 X-RAY EXAM OF ANKLE: CPT | Mod: 26,RT,, | Performed by: RADIOLOGY

## 2023-11-15 PROCEDURE — 29405 APPL SHORT LEG CAST: CPT | Mod: S$PBB,58,RT, | Performed by: ORTHOPAEDIC SURGERY

## 2023-11-15 PROCEDURE — 99999PBSHW PR PBB SHADOW TECHNICAL ONLY FILED TO HB: ICD-10-PCS | Mod: PBBFAC,,,

## 2023-11-15 PROCEDURE — 29405 PR APPLY SHORT LEG CAST: ICD-10-PCS | Mod: S$PBB,58,RT, | Performed by: ORTHOPAEDIC SURGERY

## 2023-11-15 PROCEDURE — 29405 APPL SHORT LEG CAST: CPT | Mod: PBBFAC,RT | Performed by: ORTHOPAEDIC SURGERY

## 2023-11-15 PROCEDURE — 99024 PR POST-OP FOLLOW-UP VISIT: ICD-10-PCS | Mod: POP,,, | Performed by: ORTHOPAEDIC SURGERY

## 2023-11-15 PROCEDURE — 99999PBSHW PR PBB SHADOW TECHNICAL ONLY FILED TO HB: Mod: PBBFAC,,,

## 2023-11-15 PROCEDURE — 99999 PR PBB SHADOW E&M-EST. PATIENT-LVL III: ICD-10-PCS | Mod: PBBFAC,,, | Performed by: ORTHOPAEDIC SURGERY

## 2023-11-15 PROCEDURE — 99213 OFFICE O/P EST LOW 20 MIN: CPT | Mod: PBBFAC | Performed by: ORTHOPAEDIC SURGERY

## 2023-11-15 PROCEDURE — 99024 POSTOP FOLLOW-UP VISIT: CPT | Mod: POP,,, | Performed by: ORTHOPAEDIC SURGERY

## 2023-11-15 PROCEDURE — 73610 X-RAY EXAM OF ANKLE: CPT | Mod: TC,RT

## 2023-11-15 PROCEDURE — 99999 PR PBB SHADOW E&M-EST. PATIENT-LVL III: CPT | Mod: PBBFAC,,, | Performed by: ORTHOPAEDIC SURGERY

## 2023-11-15 PROCEDURE — 73610 XR ANKLE COMPLETE 3 VIEW RIGHT: ICD-10-PCS | Mod: 26,RT,, | Performed by: RADIOLOGY

## 2023-11-15 NOTE — PROGRESS NOTES
Patient ID: Katy Woodruff is a 77 y.o. female.     Chief Complaint: Post-op Evaluation of the Right Ankle        HPI:  Ms. Woodruff returned today for 1st postop visit after an ORIF of a trimalleolar right ankle fracture performed on 09/25/2023.  She states she is doing well and her pain is well controlled.  She has been in his splint since surgery.  She has been nonweightbearing with a knee scooter since surgery.  She injured her right ankle on 09/18/2023 when she was bringing her trash can to the side of her house and slipped and fell.      ROS:  New diagnosis/surgery/prescriptions since last office visit on 09/25/2023: ORIF trimalleolar right ankle fracture.  Constitutional: Negative for chills and fever.   HENT:  Negative for congestion.    Eyes:  Negative for blurred vision and double vision.   Cardiovascular:  Negative for chest pain and cyanosis.   Respiratory:  Negative for cough and shortness of breath.    Endocrine: Negative for polydipsia.   Hematologic/Lymphatic: Negative for adenopathy.   Skin:  Negative for flushing, itching and skin cancer.   Musculoskeletal:  Positive for joint pain and joint swelling. Negative for gout.   Gastrointestinal:  Negative for constipation, diarrhea and heartburn.   Genitourinary:  Negative for nocturia.   Neurological:  Negative for headaches and seizures.   Psychiatric/Behavioral:  Negative for depression and substance abuse. The patient is not nervous/anxious.    Allergic/Immunologic: Negative for environmental allergies.         Objective:      Objective   Physical Exam:   General: AAOx3.  No acute distress  Vascular:  Pulses intact and equal bilaterally.  Capillary refill less than 3 seconds and equal bilaterally  Neurologic:  Pinprick and soft touch intact and equal bilaterally  Integment:  Incisions well approximated with sutures in place.  Extremity:  Ankle:  Cast in good condition, with cast removed:  No swelling.  Nontender with palpation.  Relatively  nontender with motion within allowable.  Good toe motion without pain.  Radiography:  Reviewed x-rays of the right ankle completed on 11/15/2023 which showed a near anatomically aligned trimalleolar ankle fracture with plates and screws in place.  Early callus present.    Assessment:      Impression:      1. ORIF trimalleolar right ankle fracture      Plan:       1.  Discussed physical examination and radiographic evaluation with the patient. Katy understands that she appears to be doing well in his maintaining the alignment of her fracture.  She needs to be cast immobilized for about 1 more month.  2. Placed back in a well-molded short-leg fiberglass cast.  3. Cast care instructions were reinforced with the patient she understands she should keep it clean and dry do not stick anything in the cast.  4. Place cast shoe back on cast the patient had 1 with her today.  5. The patient understands she should bring her Cam walker to her next visit to be transitioned from a cast to a cam walker and hopefully start progressive weight-bearing.    6. Any pain can be treated with over-the-counter medications dosed per box instructions.    7. Walk with crutches/walker/knee scooter nonweightbearing to the right lower extremity.  8. Follow up in 1 month with an x-ray out of plaster right ankle.

## 2023-11-20 ENCOUNTER — TELEPHONE (OUTPATIENT)
Dept: ORTHOPEDICS | Facility: CLINIC | Age: 78
End: 2023-11-20
Payer: MEDICARE

## 2023-11-20 NOTE — TELEPHONE ENCOUNTER
Returned call. I stated her appointment had been rescheduled per her request to 12/13/23 at 8:15 a.m. in [facility]. The patient was agreeable to the appointment.    ----- Message from Natalie Jones MA sent at 11/20/2023  2:21 PM CST -----  Contact: pt  Needs follow up appt prior to 12/15   Will be traveling back home  Call back  [phone]

## 2023-12-05 DIAGNOSIS — S82.851D CLOSED TRIMALLEOLAR FRACTURE OF ANKLE, RIGHT, WITH ROUTINE HEALING, SUBSEQUENT ENCOUNTER: Primary | ICD-10-CM

## 2023-12-13 ENCOUNTER — OFFICE VISIT (OUTPATIENT)
Dept: ORTHOPEDICS | Facility: CLINIC | Age: 78
End: 2023-12-13
Payer: MEDICARE

## 2023-12-13 ENCOUNTER — HOSPITAL ENCOUNTER (OUTPATIENT)
Dept: RADIOLOGY | Facility: HOSPITAL | Age: 78
Discharge: HOME | End: 2023-12-13
Attending: ORTHOPAEDIC SURGERY
Payer: MEDICARE

## 2023-12-13 VITALS — BODY MASS INDEX: 23.31 KG/M2 | RESPIRATION RATE: 16 BRPM | WEIGHT: 145.06 LBS | HEIGHT: 66 IN

## 2023-12-13 DIAGNOSIS — S82.851D CLOSED TRIMALLEOLAR FRACTURE OF ANKLE, RIGHT, WITH ROUTINE HEALING, SUBSEQUENT ENCOUNTER: ICD-10-CM

## 2023-12-13 DIAGNOSIS — S82.851D CLOSED TRIMALLEOLAR FRACTURE OF ANKLE, RIGHT, WITH ROUTINE HEALING, SUBSEQUENT ENCOUNTER: Primary | ICD-10-CM

## 2023-12-13 PROCEDURE — 99999 PR PBB SHADOW E&M-EST. PATIENT-LVL III: ICD-10-PCS | Mod: PBBFAC,,, | Performed by: ORTHOPAEDIC SURGERY

## 2023-12-13 PROCEDURE — 99024 POSTOP FOLLOW-UP VISIT: CPT | Mod: POP,,, | Performed by: ORTHOPAEDIC SURGERY

## 2023-12-13 PROCEDURE — 99024 PR POST-OP FOLLOW-UP VISIT: ICD-10-PCS | Mod: POP,,, | Performed by: ORTHOPAEDIC SURGERY

## 2023-12-13 PROCEDURE — 99213 OFFICE O/P EST LOW 20 MIN: CPT | Mod: PBBFAC | Performed by: ORTHOPAEDIC SURGERY

## 2023-12-13 PROCEDURE — 99999 PR PBB SHADOW E&M-EST. PATIENT-LVL III: CPT | Mod: PBBFAC,,, | Performed by: ORTHOPAEDIC SURGERY

## 2023-12-13 PROCEDURE — 73610 X-RAY EXAM OF ANKLE: CPT | Mod: TC,RT

## 2023-12-13 PROCEDURE — 73610 X-RAY EXAM OF ANKLE: CPT | Mod: 26,RT,, | Performed by: RADIOLOGY

## 2023-12-13 PROCEDURE — 73610 XR ANKLE COMPLETE 3 VIEW RIGHT: ICD-10-PCS | Mod: 26,RT,, | Performed by: RADIOLOGY

## 2023-12-13 NOTE — PROGRESS NOTES
Patient ID: Katy Woodruff is a 77 y.o. female.     Chief Complaint: Post-op Evaluation of the Right Ankle        HPI:  Ms. Woodruff returned today for a 10 week follow-up visit after an ORIF of a trimalleolar right ankle fracture performed on 09/25/2023.  She states she is doing well and her pain is well controlled.  She has been in a cast since her last visit.  She has been nonweightbearing with a knee scooter since surgery.  She injured her right ankle on 09/18/2023 when she was bringing her trash can to the side of her house and slipped and fell.      ROS:  No new diagnosis/surgery/prescriptions since last office visit on 11/15/2023.  Constitutional: Negative for chills and fever.   HENT:  Negative for congestion.    Eyes:  Negative for blurred vision and double vision.   Cardiovascular:  Negative for chest pain and cyanosis.   Respiratory:  Negative for cough and shortness of breath.    Endocrine: Negative for polydipsia.   Hematologic/Lymphatic: Negative for adenopathy.   Skin:  Negative for flushing, itching and skin cancer.   Musculoskeletal:  Positive for joint pain and joint swelling. Negative for gout.   Gastrointestinal:  Negative for constipation, diarrhea and heartburn.   Genitourinary:  Negative for nocturia.   Neurological:  Negative for headaches and seizures.   Psychiatric/Behavioral:  Negative for depression and substance abuse. The patient is not nervous/anxious.    Allergic/Immunologic: Negative for environmental allergies.       Objective:      Physical Exam:   General: AAOx3.  No acute distress  Vascular:  Pulses intact and equal bilaterally.  Capillary refill less than 3 seconds and equal bilaterally  Neurologic:  Pinprick and soft touch intact and equal bilaterally  Integment:  Incisions well approximated with sutures in place.  Extremity:  Ankle:  Cast in good condition, with cast removed:  No swelling.  Nontender with palpation.  Relatively nontender with motion within allowable.  Good  toe motion without pain.  Good toe motion without pain.  Radiography:  Reviewed x-rays of the right ankle completed on 12/13/2023 which showed a near anatomically aligned trimalleolar ankle fracture with plates and screws in place.  Callus present     Assessment:      Impression:      1. ORIF trimalleolar right ankle fracture      Plan:       1.  Discussed physical examination and radiographic evaluation with the patient. Katy understands that she has healed her fracture to the point that she can start doing a progressive weight-bearing program.  2. Discontinue cast and place in a Cam walker.  The patient brought a cam walker with her here today.  3. Start doing a home rehab program with progressive weight-bearing.  She understands she is to start bearing 25% of her weight today and increase by 25% each week until she is at 100% weight-bearing.  Until she is at 100% weight-bearing she understands she must have her Cam walker on at all times while bearing weight and also use a walker or 2 crutches.  Once she is at 100% weight-bearing she can discontinue the walker or crutches but must wear the Cam walker for another week and then transition into a Swede-O brace with a normal shoe.  She understands if she has a sudden increase in pain she should stop bearing weight and call the office.    4. Swede-O brace, right ankle, 1 was fitted to the patient to start wearing 1 week after she is at 100% weight-bearing.  5. Any pain can be treated with over-the-counter medications dosed per box instructions.    6. Refer to physical therapy to start a ankle fracture rehab program.  7. Follow up in 1 month with an x-ray of the right ankle.

## 2023-12-27 ENCOUNTER — TELEPHONE (OUTPATIENT)
Dept: ORTHOPEDICS | Facility: CLINIC | Age: 78
End: 2023-12-27
Payer: MEDICARE

## 2023-12-27 DIAGNOSIS — S82.851D CLOSED TRIMALLEOLAR FRACTURE OF ANKLE, RIGHT, WITH ROUTINE HEALING, SUBSEQUENT ENCOUNTER: Primary | ICD-10-CM

## 2023-12-27 NOTE — TELEPHONE ENCOUNTER
Returned call. Stated the therapy order will be faxed after it is signed per the patient's request. Carlotta stated understanding.    ----- Message from Leodan Aguirre MA sent at 12/27/2023 10:59 AM CST -----  Contact: Manan Laguerre PT in [place]  Would like orders for patient for Physical Therapy faxed to: [phone].  Call back number is : [phone]

## 2023-12-28 ENCOUNTER — DOCUMENTATION ONLY (OUTPATIENT)
Dept: ORTHOPEDICS | Facility: CLINIC | Age: 78
End: 2023-12-28
Payer: MEDICARE

## 2023-12-28 NOTE — PROGRESS NOTES
The patient's physical therapy order was successfully faxed to [facility] at [phone].

## 2024-01-31 ENCOUNTER — DOCUMENTATION ONLY (OUTPATIENT)
Dept: ORTHOPEDICS | Facility: CLINIC | Age: 79
End: 2024-01-31
Payer: MEDICARE

## 2024-01-31 NOTE — PROGRESS NOTES
[facility]'s plan of care was reviewed and signed by the provider. The document was successfully faxed to [phone].    Batched to medical records.

## (undated) DEVICE — SCREW R3CON NLOK PLT 3.5X12MM
Type: IMPLANTABLE DEVICE | Site: ANKLE | Status: NON-FUNCTIONAL
Removed: 2023-09-25

## (undated) DEVICE — SOL POVIDONE PREP IODINE 4 OZ

## (undated) DEVICE — SPONGE GAUZE 16PLY 4X4

## (undated) DEVICE — TOWEL OR DISP STRL BLUE 4/PK

## (undated) DEVICE — SUT 3-0 VICRYL / SH (J416)

## (undated) DEVICE — TOURNIQUET 1 PORT WHITE 30X4IN

## (undated) DEVICE — DRAPE C ARM 42 X 120 10/BX

## (undated) DEVICE — DRAPE T EXTRM SURG 121X128X90

## (undated) DEVICE — PADDING CAST 4IN SPECIALIST

## (undated) DEVICE — BANDAGE MATRIX HK LOOP 6IN 5YD

## (undated) DEVICE — TRAY SKIN SCRUB WET PREMIUM

## (undated) DEVICE — GLOVE SURG ULTRA TOUCH 8.5

## (undated) DEVICE — ELECTRODE REM PLYHSV RETURN 9

## (undated) DEVICE — GLOVE GAMMEX SURG LF PI SZ 8

## (undated) DEVICE — PAD CAST SPECIALIST STRL 4

## (undated) DEVICE — DRAPE THREE-QUARTER 53X77IN

## (undated) DEVICE — GOWN POLY REINF BRTH SLV LG

## (undated) DEVICE — KWIRE SMTH TRCR TIP 1.2X150MM
Type: IMPLANTABLE DEVICE | Site: ANKLE | Status: NON-FUNCTIONAL
Removed: 2023-09-25

## (undated) DEVICE — BLADE #15 STERILE CARBON

## (undated) DEVICE — DRESSING N ADH OIL EMUL 3X3

## (undated) DEVICE — GOWN POLY REINF X-LONG XL

## (undated) DEVICE — CANISTER SUCTION RIGID 3000CC

## (undated) DEVICE — SOL 9P NACL IRR PIC IL

## (undated) DEVICE — GAUZE SPONGE 4X4 12PLY

## (undated) DEVICE — WRAP SELF ADH. COBAN 4X5YD

## (undated) DEVICE — GLOVE SURG ULTRA TOUCH 8

## (undated) DEVICE — SUT 4-0 ETHILON 18 PS-2

## (undated) DEVICE — DRAPE STERI U-SHAPED 47X51IN

## (undated) DEVICE — GLOVE SURG ULTRA TOUCH 9

## (undated) DEVICE — SUT ETHILON 4-0 BLK MONO

## (undated) DEVICE — Device

## (undated) DEVICE — GLOVE SENSICARE PI SURG 7

## (undated) DEVICE — BANDAGE ESMARK ELASTIC ST 4X9

## (undated) DEVICE — NDL ELECTRODE E-Z CLEAN 2.75IN

## (undated) DEVICE — COVER LIGHT HANDLE 80/CA

## (undated) DEVICE — GLOVE SENSICARE PI SURG 6

## (undated) DEVICE — DRAPE STERI INSTRUMENT 1018

## (undated) DEVICE — 2.4 DRILL BIT

## (undated) DEVICE — SPLINT PLASTER FAST SET 5X30IN

## (undated) DEVICE — BANDAGE ESMARK 6X12

## (undated) DEVICE — DRILL BIT

## (undated) DEVICE — GLOVE SENSICARE PI SURG 6.5

## (undated) DEVICE — SUT 4-0 VICRYL / SH

## (undated) DEVICE — WIRE OLIVE THREADED 1.4X60MM
Type: IMPLANTABLE DEVICE | Site: ANKLE | Status: NON-FUNCTIONAL
Removed: 2023-09-25